# Patient Record
Sex: FEMALE | Race: WHITE
[De-identification: names, ages, dates, MRNs, and addresses within clinical notes are randomized per-mention and may not be internally consistent; named-entity substitution may affect disease eponyms.]

---

## 2017-11-18 ENCOUNTER — HOSPITAL ENCOUNTER (EMERGENCY)
Dept: HOSPITAL 80 - FED | Age: 82
Discharge: HOME | End: 2017-11-18
Payer: COMMERCIAL

## 2017-11-18 VITALS — RESPIRATION RATE: 16 BRPM

## 2017-11-18 VITALS
SYSTOLIC BLOOD PRESSURE: 155 MMHG | HEART RATE: 70 BPM | DIASTOLIC BLOOD PRESSURE: 96 MMHG | TEMPERATURE: 98.4 F | OXYGEN SATURATION: 96 %

## 2017-11-18 DIAGNOSIS — S00.83XA: ICD-10-CM
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
## 2017-11-18 DIAGNOSIS — S46.911A: Primary | ICD-10-CM

## 2017-11-18 NOTE — EDPHY
H & P


Stated Complaint: fall


Time Seen by Provider: 11/18/17 08:02


HPI/ROS: 





CHIEF COMPLAINT:  Mechanical fall, right arm pain, minor facial injury





HISTORY OF PRESENT ILLNESS:  The patient has a history of Parkinson's disease.  

She was getting out of her bed today and her walker was in on locked position.  

It slipped as she was standing up causing her to fall forward.  She struck her 

left cheek.  She also twisted her right arm.  The patient has been ambulatory 

since that time.  The patient denies any anticoagulant use.  She has no 

complaints of headache or neck pain.  The patient does complain of right 

shoulder pain.  She denies any acute abdominal pain, severe headache, numbness, 

acute weakness or additional complaints.  The pain in her right arm as mild and 

worsened with movement.





REVIEW OF SYSTEMS:


A comprehensive 10 point review of systems is otherwise negative aside from 

elements mentioned in the history of present illness.


Source: Patient


Exam Limitations: No limitations





- Personal History


Current Tetanus Diphtheria and Acellular Pertussis (TDAP): Unsure





- Medical/Surgical History


Hx Asthma: No


Hx Chronic Respiratory Disease: No


Hx Diabetes: No


Hx Cardiac Disease: No


Hx Renal Disease: Yes


Hx Cirrhosis: No


Hx Alcoholism: No


Hx HIV/AIDS: No


Hx Splenectomy or Spleen Trauma: No


Other PMH: medical  Nephritis at young age, Rheumatic fever, Parkinsons.  

surgery  tonsillectomy, appendectomy, tubes tied at 42, cosmetic surgery.





- Social History


Smoking Status: Never smoked





- Physical Exam


Exam: 





General Appearance:  Elderly female, no acute distress


Head:  Mild ecchymotic changes noted to left cheek


Eyes:  Pupils equal, round, reactive


ENT, Mouth:  No hemotympanum, no oral trauma


Neck:  Nontender, trachea midline


Respiratory:  No chest wall tender, subcutaneous air, lungs clear bilaterally


Cardiovascular:  Regular rate and rhythm


Abdomen:  Abdomen is soft and nontender, pelvis stable


Skin:  No lacerations, No abrasion


Back:  No midline T/L/S pain


Extremities:  Tenderness to palpation right shoulder


Neurological:  A&Ox3, normal motor function, normal sensory exam, mild cogwheel 

rigidity consistent with Parkinson's disease


Constitutional: 


 Initial Vital Signs











Temperature (C)  36.4 C   11/18/17 08:00


 


Heart Rate  73   11/18/17 08:00


 


Respiratory Rate  16   11/18/17 08:00


 


Blood Pressure  171/97 H  11/18/17 08:00


 


O2 Sat (%)  94   11/18/17 08:00








 











O2 Delivery Mode               Room Air














Allergies/Adverse Reactions: 


 





No Known Allergies Allergy (Unverified 06/23/14 13:56)


 








Home Medications: 














 Medication  Instructions  Recorded


 


Alendronate Sodium [Fosamax 10 MG]  06/23/14


 


Azilect  06/23/14


 


Carbidopa-Levo  mg Odt  06/23/14


 


Fesoterodine Fumarate [Toviaz] 4 mg PO 06/23/14


 


Mirabegron [Myrbetriq]  03/03/15


 


Mirtazapine [Remeron]  03/03/15


 


Rivastigmine [Exelon 4.6mg/24  03/03/15





hours]  


 


PRILOSEC  07/18/16














Medical Decision Making





- Diagnostics


Imaging Results: 


 Imaging Impressions





Chest X-Ray  11/18/17 08:04


Impression: Nothing acute identified.


 








Humerus X-Ray  11/18/17 08:04


Impression:


1. Normal humerus.


2. Possible tiny glenoid chip of undetermined chronicity or etiology.











ED Course/Re-evaluation: 





The patient presents to the ED for evaluation of facial trauma and a right arm 

and chest wall injury following a mechanical fall.  Upon arrival the patient is 

noted to be neurologically intact.  She has a GCS of 15.  She has no midline 

cervical spine tenderness.  The patient's abdominal examination is reassuring.  

She did have tenderness in her right humerus and mild right anterior chest wall 

tenderness.  She had x-rays of her humerus and chest which demonstrated no 

evidence of significant traumatic injury.





The patient does have mild facial ecchymosis.  I do not see indication for head 

CT scanning given her lack of hematoma, lack of headache, normal neurologic 

exam and the fact that she is not anticoagulated.





The patient will be discharged from the emergency department with customary 

aftercare instructions and return precautions.





Re-examination at 9:00 a.m.:  GCS 15, no acute distress, no additional 

traumatic injury noted


Differential Diagnosis: 





Differential diagnosis considered includes fracture, sprain, dislocation





Departure





- Departure


Disposition: Home, Routine, Self-Care


Clinical Impression: 


 Facial contusion, Strain of upper arm, right





Condition: Good


Instructions:  Contusion in Adults (ED), Musculoskeletal Pain (ED)


Additional Instructions: 


1. Take Ibuprofen or Motrin 600 mg by mouth three times a day.


2. Return to the ED for any worsening symptoms, difficulty breathing or other 

concerns.


Referrals: 


Frankel,Zara, MD [Primary Care Provider] - As per Instructions

## 2018-01-06 ENCOUNTER — HOSPITAL ENCOUNTER (INPATIENT)
Dept: HOSPITAL 80 - FED | Age: 83
LOS: 2 days | Discharge: HOME HEALTH | DRG: 149 | End: 2018-01-08
Attending: INTERNAL MEDICINE | Admitting: INTERNAL MEDICINE
Payer: COMMERCIAL

## 2018-01-06 DIAGNOSIS — R42: Primary | ICD-10-CM

## 2018-01-06 DIAGNOSIS — R05: ICD-10-CM

## 2018-01-06 DIAGNOSIS — B34.9: ICD-10-CM

## 2018-01-06 DIAGNOSIS — G20: ICD-10-CM

## 2018-01-06 DIAGNOSIS — N17.9: ICD-10-CM

## 2018-01-06 DIAGNOSIS — E86.0: ICD-10-CM

## 2018-01-06 LAB — PLATELET # BLD: 238 10^3/UL (ref 150–400)

## 2018-01-06 PROCEDURE — G0378 HOSPITAL OBSERVATION PER HR: HCPCS

## 2018-01-06 NOTE — PDGENHP
History and Physical





- Chief Complaint


Dizziness





- History of Present Illness


83 yo F w/ PD presents with dizziness. Patient has been feeling unwell for 

about 1 week. She first noticed a cough on Monday (6 days PTA), which she 

states several residents in her facility have. Over the next few days she noted 

low urine output and a UA was checked. She was started on Bactrim 3 days ago 

for a presumed infectious UA, although I do not have this data to review. After 

the Bactrim was started she began to notice dizziness, which was the main 

reason for presentation. She has a difficult time describing the nature of her 

dizziness but thinks it is most present with ambulation and less so at rest. 

She does not think the room is spinning around her.





In the ED an MRI brain was obtain that revealed no acute abnormalities. 

Laboratory work-up revealed only a mild JU. She is being admitted for 

persistent symptoms and observation.





History Information





- Allergies/Home Medication List


Allergies/Adverse Reactions: 








No Known Allergies Allergy (Verified 01/06/18 18:12)


 





Home Medications: 








Albuterol Sulfate [Proair Hfa] 1 puffs IH QID PRN 01/06/18 [Last Taken Unknown]


Carbidopa/Levodopa [Carbidopa-Levo  mg Odt] 2 each PO TID 01/06/18 [Last 

Taken 01/06/18 12:00]


Escitalopram Oxalate [Lexapro] 10 mg PO HS 01/06/18 [Last Taken 01/05/18]


Fluticasone Nasal [Flonase Nasal Spray (RX)] 1 sprays NASAL DAILY 01/06/18 [

Last Taken Unknown]


Herbals/Supplements -Info Only 1 ea PO DAILY 01/06/18 [Last Taken Unknown]


Mirabegron [Myrbetriq] 25 mg PO DAILY 01/06/18 [Last Taken 01/06/18]


Rivastigmine [Exelon 4.6mg/24 hours] 1 each TD DAILY 01/06/18 [Last Taken 01/06/ 18]


Sulfamethox/Tmp 800/160 mg [Bactrim Ds] 1 tab PO BID 01/06/18 [Last Taken 01/06/ 18 08:00]





I have personally reviewed and updated: family history, medical history





- Past Medical History


Additional medical history: Parkinson's Disease





- Family History


Positive for: cancer





- Social History


Smoking Status: Never smoked





Review of Systems


Review of Systems: 





ROS: 10pt was reviewed & negative except for what was stated in HPI & below





Physical Exam


Physical Exam: 

















Temp Pulse Resp BP Pulse Ox


 


 36.7 C   83   18   149/82 H  96 


 


 01/06/18 18:12  01/06/18 18:12  01/06/18 18:12  01/06/18 18:12  01/06/18 18:12











Constitutional: no apparent distress, not in pain


Eyes: PERRL, EOMI


Ears, Nose, Mouth, Throat: moist mucous membranes, ears appear normal


Cardiovascular: regular rate and rhythym, no murmur, rub, or gallop


Respiratory: no respiratory distress, clear to auscultation


Gastrointestinal: normoactive bowel sounds, soft, non-tender abdomen


Skin: warm, normal color


Musculoskeletal: full muscle strength, no muscle tenderness, other (Mild cog-

wheeling in b/l UE's)


Psychiatric: interacting appropriately, not anxious





Lab Data & Imaging Review





 01/06/18 18:30





 01/06/18 18:30














WBC  6.61 10^3/uL (3.80-9.50)   01/06/18  18:30    


 


RBC  3.98 10^6/uL (4.18-5.33)  L  01/06/18  18:30    


 


Hgb  12.6 g/dL (12.6-16.3)   01/06/18  18:30    


 


Hct  36.9 % (38.0-47.0)  L  01/06/18  18:30    


 


MCV  92.7 fL (81.5-99.8)   01/06/18  18:30    


 


MCH  31.7 pg (27.9-34.1)   01/06/18  18:30    


 


MCHC  34.1 g/dL (32.4-36.7)   01/06/18  18:30    


 


RDW  14.0 % (11.5-15.2)   01/06/18  18:30    


 


Plt Count  238 10^3/uL (150-400)   01/06/18  18:30    


 


MPV  8.9 fL (8.7-11.7)   01/06/18  18:30    


 


Neut % (Auto)  68.6 % (39.3-74.2)   01/06/18  18:30    


 


Lymph % (Auto)  18.6 % (15.0-45.0)   01/06/18  18:30    


 


Mono % (Auto)  9.7 % (4.5-13.0)   01/06/18  18:30    


 


Eos % (Auto)  2.0 % (0.6-7.6)   01/06/18  18:30    


 


Baso % (Auto)  0.9 % (0.3-1.7)   01/06/18  18:30    


 


Nucleat RBC Rel Count  0.0 % (0.0-0.2)   01/06/18  18:30    


 


Absolute Neuts (auto)  4.54 10^3/uL (1.70-6.50)   01/06/18  18:30    


 


Absolute Lymphs (auto)  1.23 10^3/uL (1.00-3.00)   01/06/18 18:30    


 


Absolute Monos (auto)  0.64 10^3/uL (0.30-0.80)   01/06/18  18:30    


 


Absolute Eos (auto)  0.13 10^3/uL (0.03-0.40)   01/06/18  18:30    


 


Absolute Basos (auto)  0.06 10^3/uL (0.02-0.10)   01/06/18  18:30    


 


Absolute Nucleated RBC  0.00 10^3/uL (0-0.01)   01/06/18  18:30    


 


Immature Gran %  0.2 % (0.0-1.1)   01/06/18  18:30    


 


Immature Gran #  0.01 10^3/uL (0.00-0.10)   01/06/18  18:30    


 


Sodium  139 mEq/L (134-144)   01/06/18  18:30    


 


Potassium  4.5 mEq/L (3.5-5.2)   01/06/18  18:30    


 


Chloride  101 mEq/L ()   01/06/18  18:30    


 


Carbon Dioxide  24 mEq/l (22-31)   01/06/18  18:30    


 


Anion Gap  14 mEq/L (8-16)   01/06/18  18:30    


 


BUN  23 mg/dL (7-23)   01/06/18  18:30    


 


Creatinine  1.3 mg/dL (0.6-1.0)  H  01/06/18  18:30    


 


Estimated GFR  39   01/06/18  18:30    


 


Glucose  93 mg/dL ()   01/06/18  18:30    


 


Calcium  10.0 mg/dL (8.5-10.4)   01/06/18  18:30    


 


Total Bilirubin  0.9 mg/dL (0.1-1.4)   01/06/18  18:30    


 


AST  37 IU/L (14-46)   01/06/18  18:30    


 


ALT  20 IU/L (9-52)   01/06/18  18:30    


 


Alkaline Phosphatase  102 IU/L ()   01/06/18  18:30    


 


Total Protein  8.5 g/dL (6.3-8.2)  H  01/06/18  18:30    


 


Albumin  4.4 g/dL (3.5-5.0)   01/06/18  18:30    


 


Urine Color  YELLOW   01/06/18  21:10    


 


Urine Appearance  CLEAR   01/06/18  21:10    


 


Urine pH  6.0  (5.0-7.5)   01/06/18  21:10    


 


Ur Specific Gravity  1.005  (1.002-1.030)   01/06/18  21:10    


 


Urine Protein  NEGATIVE  (NEGATIVE)   01/06/18  21:10    


 


Urine Ketones  TRACE  (NEGATIVE)  H  01/06/18  21:10    


 


Urine Blood  NEGATIVE  (NEGATIVE)   01/06/18  21:10    


 


Urine Nitrate  NEGATIVE  (NEGATIVE)   01/06/18  21:10    


 


Urine Bilirubin  NEGATIVE  (NEGATIVE)   01/06/18  21:10    


 


Urine Urobilinogen  NEGATIVE EU (0.2-1.0)   01/06/18  21:10    


 


Ur Leukocyte Esterase  NEGATIVE  (NEGATIVE)   01/06/18  21:10    


 


Urine RBC  1-3 /hpf (0-3)   01/06/18  21:10    


 


Urine WBC  1-3 /hpf (0-3)   01/06/18  21:10    


 


Ur Epithelial Cells  NONE SEEN /lpf (NONE-1+)   01/06/18  21:10    


 


Urine Mucus  TRACE /lpf (NONE-1+)   01/06/18  21:10    


 


Urine Glucose  NEGATIVE  (NEGATIVE)   01/06/18  21:10    








Imaging Review: 





 Imaging Impressions





Brain MRI  01/06/18 19:04


Impression: Extensive periventricular and deep hemispheric white matter change, 

which is nonspecific and can be seen with small vessel ischemic disease. This 

has progressed from September 2006. No evidence for acute infarct.


 


Results called and discussed with Vladimir Yeung MD on January 6, 2018 at 

2113 hours.














Assessment & Plan


Assessment: 








83 yo F w/ PD presents with 3 days of dizziness in the setting of URI, possible 

UTI, dehydration, and Bactrim administration.





Plan: 


1. Dizziness - I suspect this is multifactorial in etiology; likely from 

dehydration, viral illness, possible UTI, and poor functional baseline in the 

setting of PD and deconditioning. Patient's symptoms are not classic for 

vertigo and MRI obtained in the ED shows no acute abnormalities.


- S/p 1.5 L IVF in the ED, I encouraged further PO intake


- Admit for observation and PT/OT evaluations


- I do not feel a Neurology evaluation is necessary at this time noting well 

controlled PD and stable brain MRI


2. JU - Serum Cr 1.3 on admission, increased from b/l of about 1. This is 

likely due to dehydration with possible contribution from Bactrim 

administration.


- S/p 1.5 L IVF, monitor BMP in AM


- Avoid nephrotoxic agents


3. Recent UTI - Completed 3 days of Bactrim therapy for presumed cystitis; will 

not continue further therapy noting normal UA on admission.


4. Parkinson's Disease - Patient reports good disease control and states 

Neurologist and outpatient PT have told her she is doing better than she has 

done in several months. I will continue Carbidopa/Levodopa and Rivastigmine at 

home doses.





Diet - Regular


Code - Full


Ppx - LMWH, low dose


Dispo - Admit to observation status

## 2018-01-06 NOTE — EDPHY
H & P


Stated Complaint: Started on Bactrim 3 days ago for UTI;now w/nausea,not 

feeling well,cough


Time Seen by Provider: 01/06/18 18:21





- Personal History


Current Tetanus Diphtheria and Acellular Pertussis (TDAP): Yes





- Medical/Surgical History


Hx Asthma: No


Hx Chronic Respiratory Disease: No


Hx Diabetes: No


Hx Cardiac Disease: No


Hx Renal Disease: Yes


Hx Cirrhosis: No


Hx Alcoholism: No


Hx HIV/AIDS: No


Hx Splenectomy or Spleen Trauma: No


Other PMH: Nephritis at young age, Rheumatic fever, Parkinsons.  tonsillectomy, 

appendectomy, tubes tied at 42, cosmetic surgery.





- Social History


Smoking Status: Never smoked


Constitutional: 


 Initial Vital Signs











Temperature (C)  36.7 C   01/06/18 18:12


 


Heart Rate  83   01/06/18 18:12


 


Respiratory Rate  18   01/06/18 18:12


 


Blood Pressure  149/82 H  01/06/18 18:12


 


O2 Sat (%)  96   01/06/18 18:12








 











O2 Delivery Mode               Room Air














Allergies/Adverse Reactions: 


 





No Known Allergies Allergy (Verified 01/06/18 18:12)


 








Home Medications: 














 Medication  Instructions  Recorded


 


Carbidopa/Levodopa [Carbidopa-Levo 1 each PO 01/06/18





 mg Odt]  


 


Escitalopram Oxalate [Lexapro]  01/06/18


 


Mirabegron [Myrbetriq] 25 mg PO 01/06/18


 


Rivastigmine [Exelon 4.6mg/24 1 each TD 01/06/18





hours]  


 


Sulfamethox/Tmp 800/160 mg 1 tab PO 01/06/18





[Bactrim Ds]  














Medical Decision Making





- Diagnostics


Imaging Results: 


 Imaging Impressions





Brain MRI  01/06/18 19:04


Impression: Extensive periventricular and deep hemispheric white matter change, 

which is nonspecific and can be seen with small vessel ischemic disease. This 

has progressed from September 2006. No evidence for acute infarct.


 


Results called and discussed with Vladimir Yeung MD on January 6, 2018 at 

2113 hours.











Imaging: Discussed imaging studies w/ On call Radiologist, I viewed and 

interpreted images myself


ED Course/Re-evaluation: 





CHIEF COMPLAINT:  Nausea, lightheadedness, dizziness





HISTORY OF PRESENT ILLNESS: The patient is an 81 y/o female with a history of 

Parkinson's who is currently being treated for a UTI and complains of nausea, 

lightheadedness, and dizziness for 3 days. She was diagnosed with a UTI 3 days 

ago and started Bactrim at that time. She states her dizziness feels like she 

is rocking on a boat and seasick; she cannot identify the exact moment the 

dizziness began. She's had difficulty eating and drinking due to her nausea and 

feels dehydrated. She does not have associated fever, chest pain, dyspnea, 

abdominal pain. She denies changes in Parkinson's symptoms since symptom onset. 

Her caregiver also states the patient's BP has been elevated around 145 

systolic rather than 90s systolic for the last few days.





REVIEW OF SYSTEMS:  





A 10 point review of systems was performed and is negative with the exception 

of the elements mentioned in the history of present illness. Dry cough for last 

week. 





PHYSICAL EXAM:  





HR, BP, O2 Sat, RR.  Temp noted


General Appearance:  Alert, well hydrated, appropriate, and non-toxic appearing.


Head:  Atraumatic without scalp tenderness or obvious injury


Eyes:  Pupils equal, round, reactive to light and accommodation, EOMI, no trauma

, no injection.


Ears:  Clear bilaterally, no perforation, normal landmarks


Nose:  Atraumatic, no rhinorrhea, clear.


Throat:  There is no erythema or exudates, no lesions, normal tonsils, mucus 

membranes moist.


Neck:  Supple, nontender, no lymphadenopathy.


Respiratory:  No retractions, no distress, no wheezes, and no accessory muscle 

use.  Lungs are clear to auscultation bilaterally.


Cardiovascular:  Regular rate and rhythm, no murmurs, rubs, or gallops. Good 

capillary refill all extremities.


Gastrointestinal:  Abdomen is soft, nontender, non-distended, no masses, no 

rebound, no guarding, no peritoneal signs.


Musculoskeletal:  Normal active ROM of all extremities, atraumatic.


Neurological:  Alert, appropriate, and interactive.  Nonfocal neuro exam. 


Skin:  No rashes, good turgor, no nodules on palpation.





Past medical history: Nephritis, rheumatic fever, urinary incontinence, 

Parkinson's - Lexapro, levodopa, carbidopa 


Past surgical history: Tonsillectomy, appendectomy, tubal ligation, cosmetic 

surgery


Family history: Noncontributory


Social history: Nonsmoker. Lives at [place] with caregiver for 3 hours daily. 

Caregiver, daughter, and  at bedside. Daughter lives in [place]. Rides 

bike 20-30min daily. PCP: Dr. Frankel.





Prior medical records reviewed including ED visit 11/18/17 for fall. 





DIAGNOSTICS/PROCEDURES/CRITICAL CARE TIME:  





Brain MRI: nothing acute





DIFFERENTIAL DIAGNOSIS:   The differential diagnosis for the patient's 

dizziness included but was not limited to peripheral and central causes of 

vertigo, orthostatic causes including dehydration, cardiogenic and neurogenic 

causes, and blood loss.





MEDICAL DECISION MAKING:  





This is an 81 y/o female with Parkinson's disease who presents for evaluation 

of nausea, lightheadedness, and dizziness in the setting of recent UTI 

diagnosis being treated empirically with Bactrim. It's difficult to determine 

if patient's nausea or dizziness appeared first. Presentation could represent 

positional vertigo, dehydration, and/or UTI not sensitive to Bactrim. Brain MRI 

ordered to rule out cerebellar infarct. Plan for IV, labs, and symptom 

management. 500mL IV NS and 4mg IV Zofran administered. 





Empty bladder during straight cath for sample. Additional 1L IV NS 

administered. 





Brain MRI negative for acute process. UA negative. No obvious source of 

infection. Patient is unable to care for herself with this dizziness and nausea 

and has been unable to maintain hydration at home. I've recommended admission, 

which she agrees to. 





Spoke with hospitalist service. Dr. Iyer accepts admission. 





- Data Points


Laboratory Results: 


 Laboratory Results





 01/06/18 18:30 





 01/06/18 18:30 





 











  01/06/18 01/06/18 01/06/18





  21:10 18:30 18:30


 


WBC      6.61 10^3/uL 10^3/uL





     (3.80-9.50) 


 


RBC      3.98 10^6/uL L 10^6/uL





     (4.18-5.33) 


 


Hgb      12.6 g/dL g/dL





     (12.6-16.3) 


 


Hct      36.9 % L %





     (38.0-47.0) 


 


MCV      92.7 fL fL





     (81.5-99.8) 


 


MCH      31.7 pg pg





     (27.9-34.1) 


 


MCHC      34.1 g/dL g/dL





     (32.4-36.7) 


 


RDW      14.0 % %





     (11.5-15.2) 


 


Plt Count      238 10^3/uL 10^3/uL





     (150-400) 


 


MPV      8.9 fL fL





     (8.7-11.7) 


 


Neut % (Auto)      68.6 % %





     (39.3-74.2) 


 


Lymph % (Auto)      18.6 % %





     (15.0-45.0) 


 


Mono % (Auto)      9.7 % %





     (4.5-13.0) 


 


Eos % (Auto)      2.0 % %





     (0.6-7.6) 


 


Baso % (Auto)      0.9 % %





     (0.3-1.7) 


 


Nucleat RBC Rel Count      0.0 % %





     (0.0-0.2) 


 


Absolute Neuts (auto)      4.54 10^3/uL 10^3/uL





     (1.70-6.50) 


 


Absolute Lymphs (auto)      1.23 10^3/uL 10^3/uL





     (1.00-3.00) 


 


Absolute Monos (auto)      0.64 10^3/uL 10^3/uL





     (0.30-0.80) 


 


Absolute Eos (auto)      0.13 10^3/uL 10^3/uL





     (0.03-0.40) 


 


Absolute Basos (auto)      0.06 10^3/uL 10^3/uL





     (0.02-0.10) 


 


Absolute Nucleated RBC      0.00 10^3/uL 10^3/uL





     (0-0.01) 


 


Immature Gran %      0.2 % %





     (0.0-1.1) 


 


Immature Gran #      0.01 10^3/uL 10^3/uL





     (0.00-0.10) 


 


Sodium    139 mEq/L mEq/L  





    (134-144)  


 


Potassium    4.5 mEq/L mEq/L  





    (3.5-5.2)  


 


Chloride    101 mEq/L mEq/L  





    ()  


 


Carbon Dioxide    24 mEq/l mEq/l  





    (22-31)  


 


Anion Gap    14 mEq/L mEq/L  





    (8-16)  


 


BUN    23 mg/dL mg/dL  





    (7-23)  


 


Creatinine    1.3 mg/dL H mg/dL  





    (0.6-1.0)  


 


Estimated GFR    39   





    


 


Glucose    93 mg/dL mg/dL  





    ()  


 


Calcium    10.0 mg/dL mg/dL  





    (8.5-10.4)  


 


Total Bilirubin    0.9 mg/dL mg/dL  





    (0.1-1.4)  


 


AST    37 IU/L IU/L  





    (14-46)  


 


ALT    20 IU/L IU/L  





    (9-52)  


 


Alkaline Phosphatase    102 IU/L IU/L  





    ()  


 


Total Protein    8.5 g/dL H g/dL  





    (6.3-8.2)  


 


Albumin    4.4 g/dL g/dL  





    (3.5-5.0)  


 


Urine Color  YELLOW     





    


 


Urine Appearance  CLEAR     





    


 


Urine pH  6.0     





   (5.0-7.5)   


 


Ur Specific Gravity  1.005     





   (1.002-1.030)   


 


Urine Protein  NEGATIVE     





   (NEGATIVE)   


 


Urine Ketones  TRACE  H     





   (NEGATIVE)   


 


Urine Blood  NEGATIVE     





   (NEGATIVE)   


 


Urine Nitrate  NEGATIVE     





   (NEGATIVE)   


 


Urine Bilirubin  NEGATIVE     





   (NEGATIVE)   


 


Urine Urobilinogen  NEGATIVE EU EU    





   (0.2-1.0)   


 


Ur Leukocyte Esterase  NEGATIVE     





   (NEGATIVE)   


 


Urine RBC  1-3 /hpf /hpf    





   (0-3)   


 


Urine WBC  1-3 /hpf /hpf    





   (0-3)   


 


Ur Epithelial Cells  NONE SEEN /lpf /lpf    





   (NONE-1+)   


 


Urine Mucus  TRACE /lpf /lpf    





   (NONE-1+)   


 


Urine Glucose  NEGATIVE     





   (NEGATIVE)   











Medications Given: 


 








Discontinued Medications





Sodium Chloride (Ns)  500 mls @ 0 mls/hr IV ONCE ONE


   PRN Reason: Wide Open


   Stop: 01/06/18 18:40


   Last Admin: 01/06/18 18:40 Dose:  500 mls


Sodium Chloride (Ns)  1,000 mls @ 0 mls/hr IV ONCE ONE


   PRN Reason: Wide Open


   Stop: 01/06/18 19:55


   Last Admin: 01/06/18 20:11 Dose:  1,000 mls


Ondansetron HCl (Zofran)  4 mg IVP EDNOW ONE


   Stop: 01/06/18 18:36


   Last Admin: 01/06/18 18:37 Dose:  4 mg








Departure





- Departure


Disposition: [facility]


Clinical Impression: 


 Dizziness, Dehydration, Nausea, Parkinsons disease





Condition: Fair


Referrals: 


Frankel,Zara, MD [Primary Care Provider] - As per Instructions


Report Scribed for: Vladimir Yeung


Report Scribed by: Мария Rendon


Date of Report: 01/06/18


Time of Report: 18:46

## 2018-01-07 LAB — PLATELET # BLD: 211 10^3/UL (ref 150–400)

## 2018-01-07 RX ADMIN — CARBIDOPA AND LEVODOPA SCH TAB: 25; 100 TABLET ORAL at 00:19

## 2018-01-07 RX ADMIN — CARBIDOPA AND LEVODOPA SCH TAB: 25; 100 TABLET ORAL at 20:34

## 2018-01-07 RX ADMIN — CARBIDOPA AND LEVODOPA SCH TAB: 25; 100 TABLET ORAL at 15:25

## 2018-01-07 RX ADMIN — CARBIDOPA AND LEVODOPA SCH TAB: 25; 100 TABLET ORAL at 09:05

## 2018-01-07 RX ADMIN — ENOXAPARIN SODIUM SCH MG: 100 INJECTION SUBCUTANEOUS at 09:05

## 2018-01-07 NOTE — PDMN
Medical Necessity


Medical necessity: C/M review:  est. > 2 MN LOS for eval and TX of acute and 

persistent dizziness, dehydration, acute kidney injury, cough with borderline 

hypoxemia, weakness,  requiring IV fluids, ongoing pulse oximetry, supplemental 

O2, acute inpt PT, comorbid upper respiratory infection, recent urinary tract 

infection treated with three dats of oral Bactrim, Parkinson's disease, patient 

has not walked independently since admission per 1/7/2018 Hospitalist progress 

note.

## 2018-01-07 NOTE — HOSPPROG
Hospitalist Progress Note


Assessment/Plan: 





83 yo F w/ PD presents with 3 days of dizziness in the setting of URI, possible 

UTI, dehydration, and Bactrim administration.  Patient is new to me today





- Dizziness - multifactorial in nature secondary to dehydration viral illness 

possible recent UTI.  She appears to be close to her usual functional status 

and the MRI does not show any acute findings as explanation for the dizziness.  

Plan would be for fluid hydration to continue see if this resolves





-acute kidney injury with a serum creatinine 1.3 on admission now declining.  

This was due to dehydration and improving





-persistent cough with borderline hypoxemia.  Chest x-ray shows chronic 

interstitial lung disease and perhaps the patient has a chronic cough although 

she seems trouble by it and says that this was the reason for her problem.  Her 

respiratory panel was ordered at this time.  Flu seems a possible differential.





-recent UTI:  She has completed 3 days of Bactrim and this has been stopped.





-Parkinson's disease:  The patient reports she has had good disease control and 

has been told by a neurologist that she is doing better.  We are continuing the 

carbidopa levodopa and rivastigmine at home doses.





-disposition:  Patient still has dizziness and a persistent cough and 

respiratory panel is pending.  1 more evening in the hospital seems prudent and 

it seems improved to discharge her late in the evening to her assisted living.  

She has yet to walk independently in the encarnacion as she would at her assisted 

living.





-code:  Full





-DVT prophylaxis with LMWH








Subjective: Reports she has a persistent cough that is nonproductive and says 

that has been ongoing for 3-5 days she believes this is the reason she came to 

the hospital.  She admits that her dizziness seems to be better although she 

has not walked in the encarnacion and she reports feeling weak


Objective: 


 Vital Signs











Temp Pulse Resp BP Pulse Ox


 


 37.1 C   98   16   123/81 H  96 


 


 01/07/18 16:00  01/07/18 16:00  01/07/18 16:00  01/07/18 16:00  01/07/18 16:00








 Laboratory Results





 01/07/18 04:17 





 01/07/18 04:17 





 











 01/06/18 01/07/18 01/08/18





 05:59 05:59 05:59


 


Intake Total  2325 600


 


Output Total  550 550


 


Balance  1775 50














- Time Spent With Patient


Time Spent with Patient: greater than 35 minutes


Time Spent with Patient: Greater than 35 minutes spent on this patients care, 

greater than 50% of time spent counseling, educating, and coordinating care 

regarding the above mentioned plan.





- Pending Discharge


Pending Discharge Within 24 Hours: Yes


Pending Discharge Date: 01/08/18


Pending Discharge Time: 11:00





- Physical Exam


Constitutional: no apparent distress


Eyes: PERRL


Ears, Nose, Mouth, Throat: moist mucous membranes, hard of hearing


Cardiovascular: regular rate and rhythym, no murmur, rub, or gallop


Respiratory: reduced air movement, inspiratory crackles, bronchial breath sounds

, rhonchi


Gastrointestinal: normoactive bowel sounds, soft, non-tender abdomen


Genitourinary: no bladder fullness


Skin: warm


Musculoskeletal: generalized weakness


Neurologic: AAOx3, CN II-XII Intact





ICD10 Worksheet


Patient Problems: 


 Problems











Problem Status Onset


 


Dizziness Acute  


 


Dehydration Acute  


 


Nausea Acute  


 


Parkinsons disease Acute

## 2018-01-08 VITALS — RESPIRATION RATE: 16 BRPM

## 2018-01-08 VITALS — DIASTOLIC BLOOD PRESSURE: 60 MMHG | SYSTOLIC BLOOD PRESSURE: 109 MMHG | TEMPERATURE: 97.4 F | HEART RATE: 89 BPM

## 2018-01-08 VITALS — OXYGEN SATURATION: 96 %

## 2018-01-08 RX ADMIN — CARBIDOPA AND LEVODOPA SCH TAB: 25; 100 TABLET ORAL at 11:08

## 2018-01-08 RX ADMIN — ENOXAPARIN SODIUM SCH MG: 100 INJECTION SUBCUTANEOUS at 11:09

## 2018-01-08 NOTE — ASMTCMCOM
CM Note

 

CM Note                       

Notes:

Hospitalist medically cleared patient for discharge. PT and OT home care ordered through [facility]. Spoke to there intake and sent via [employer]. CM available should other needs arise.

 

Date Signed:  01/08/2018 03:12 PM

Electronically Signed By:Juani Pool RN

## 2018-01-08 NOTE — GDS
[f rep st]



                                                             DISCHARGE SUMMARY





DISCHARGE DIAGNOSES:  

1.  Dizziness. 

2.  Acute kidney injury. 

3.  Cough. 

4.  Parkinson disease. 

5.  Dehydration.



STUDIES AND PROCEDURES DONE:  MRI of the brain.



PHYSICAL EXAM:  GENERAL:  The patient is alert.  VITAL SIGNS:  Afebrile at 36.3, pulse is 89, respira
tory rate 16, blood pressure is 109/60.  She is saturating 96% on 3 L.  I have seen and evaluated the
 patient on the day of discharge.



HOSPITAL COURSE:  The patient is an 82-year-old female who presented to the emergency room with compl
aints of dizziness.  She was evaluated and diagnosed with:

1.  Dizziness.  This is multifactorial.  The patient was dehydrated as well as representing a viral i
llness at the time of admission.  Her condition has significantly improved.  She also was recently di
agnosed with urinary tract infection and treated with Bactrim, which could be exacerbating her dizzin
ess.  She has been evaluated by Physical Therapy and Occupational Therapy, and will receive Home Heal
 care at the time of disposition.

2.  Acute kidney injury.  This is improving with hydration, this is secondary to the patient's acute 
viral illness and dehydration.

3.  Persistent cough.  This is stable.  Chest x-ray demonstrates interstitial lung disease.  She shou
ld follow with her primary provider regarding this condition.

4.  History of Parkinson disease.  Her medications have been continued during this hospital course.



DISPOSITION:  The patient will be discharged to return to her normal living environment at [facility].  
Home health care has been provided for the patient at the time of disposition.  Followup will be with
 her primary care physician, Dr. Zara Frankel.  There are no other pending studies.



DISCHARGE MEDICATIONS:  Please refer to EMR form.  I have not provided the patient any prescriptions 
at the time of disposition.  



I spent greater than 35 minutes in the care, coordination, and management of this patient's dispositi
on.





Job #:  [id number]

## 2018-01-08 NOTE — PDIAF
- Diagnosis


Diagnosis: dizziness


Code Status: Full Code





- Medication Management


Discharge Medications: 


 Medications to Continue on Transfer





Albuterol Sulfate [Proair Hfa] 1 puffs IH QID PRN 01/06/18 [Last Taken Unknown]


Carbidopa/Levodopa [Carbidopa-Levo  mg Odt] 2 each PO TID 01/06/18 [Last 

Taken 01/06/18 12:00]


Escitalopram Oxalate [Lexapro] 10 mg PO HS 01/06/18 [Last Taken 01/05/18]


Fluticasone Nasal [Flonase Nasal Spray] 1 sprays NASAL DAILY 01/06/18 [Last 

Taken Unknown]


Herbals/Supplements -Info Only 1 ea PO DAILY 01/06/18 [Last Taken Unknown]


Mirabegron [Myrbetriq] 25 mg PO DAILY 01/06/18 [Last Taken 01/06/18]


Rivastigmine [Exelon 4.6mg/24 hours] 1 each TD DAILY 01/06/18 [Last Taken 01/06/ 18]








Discharge Medications: Refer to the Discharge Home Medication list for PRN 

reason.


PICC Care - Routine: N/A





- Orders


Services needed: Physical Therapy, Occupational Therapy


Diet Recommendation: no restrictions on diet





- Follow Up Care


Current Providers and Referrals: 


Frankel,Zara, MD [Primary Care Provider] - As per Instructions

## 2018-01-09 NOTE — ASDISCHSUM
----------------------------------------------

Discharge Information

----------------------------------------------

Plan Status:Home with Home Health                    Medically Cleared to Leave:

Discharge Date:01/08/2018 04:37 PM                   CM D/C Disposition:Home Health Service

ADT D/C Disposition:Home Health Service              Projected Discharge Date:01/08/2018 11:00 AM

Transportation at D/C:Family                         Discharge Delay Reason:

Follow-Up Date:01/08/2018 11:00 AM                   Discharge Slot:

Final Diagnosis:

----------------------------------------------

Placement Information

----------------------------------------------

Referral Type:*Home Health Care Services             Referral ID:[id number]

Provider Name:[facility]

Address 1:[address]                 Phone [phone]

Address 2:                                           Fax [phone]

City:[place]                               Selection Factors:

State:[place]

 

----------------------------------------------

Patient Contact Information

----------------------------------------------

Contact Name:ARTEMIO                              Relationship:Other

Address:                                             Home [phone]

                                                     Work Phone:

City:                                                [facility] Phone:

State/Zip Code:                                      Email:

----------------------------------------------

Financial Information

----------------------------------------------

Financial Class:

Primary Plan Desc:MEDICARE INPATIENT                 Primary Plan Number:[id number]

Secondary Plan Desc:[facility]                             Secondary Plan Number:[id number]

 

 

----------------------------------------------

Assessment Information

----------------------------------------------

----------------------------------------------

[facility] CM Progress Note

----------------------------------------------

CM Note

 

CM Note                       

Notes:

Hospitalist medically cleared patient for discharge. PT and OT home care ordered through [facility]. Spoke to there intake and sent via [employer]. CM available should other needs arise.

 

Date Signed:  01/08/2018 03:12 PM

Electronically Signed By:Juani Pool RN

 

 

----------------------------------------------

Intervention Information

----------------------------------------------

## 2018-02-08 ENCOUNTER — HOSPITAL ENCOUNTER (OUTPATIENT)
Dept: HOSPITAL 80 - MNOR | Age: 83
End: 2018-02-08
Attending: PSYCHIATRY & NEUROLOGY
Payer: COMMERCIAL

## 2018-02-08 DIAGNOSIS — G20: ICD-10-CM

## 2018-02-08 DIAGNOSIS — K21.9: ICD-10-CM

## 2018-02-08 DIAGNOSIS — R13.10: Primary | ICD-10-CM

## 2018-02-08 PROCEDURE — 74230 X-RAY XM SWLNG FUNCJ C+: CPT

## 2018-02-08 PROCEDURE — 92611 MOTION FLUOROSCOPY/SWALLOW: CPT

## 2018-04-02 ENCOUNTER — HOSPITAL ENCOUNTER (INPATIENT)
Dept: HOSPITAL 80 - FED | Age: 83
LOS: 5 days | Discharge: NURSING, REHAB OR LONG-TERM CARE | DRG: 329 | End: 2018-04-07
Attending: SURGERY | Admitting: SURGERY
Payer: COMMERCIAL

## 2018-04-02 DIAGNOSIS — J69.0: ICD-10-CM

## 2018-04-02 DIAGNOSIS — D62: ICD-10-CM

## 2018-04-02 DIAGNOSIS — R73.9: ICD-10-CM

## 2018-04-02 DIAGNOSIS — I95.9: ICD-10-CM

## 2018-04-02 DIAGNOSIS — Z66: ICD-10-CM

## 2018-04-02 DIAGNOSIS — K55.021: Primary | ICD-10-CM

## 2018-04-02 DIAGNOSIS — N18.3: ICD-10-CM

## 2018-04-02 DIAGNOSIS — G20: ICD-10-CM

## 2018-04-02 DIAGNOSIS — L89.301: ICD-10-CM

## 2018-04-02 LAB
INR PPP: 1.19 (ref 0.83–1.16)
PLATELET # BLD: 270 10^3/UL (ref 150–400)
PROTHROMBIN TIME: 15.3 SEC (ref 12–15)

## 2018-04-02 PROCEDURE — P9041 ALBUMIN (HUMAN),5%, 50ML: HCPCS

## 2018-04-02 PROCEDURE — 0DB80ZZ EXCISION OF SMALL INTESTINE, OPEN APPROACH: ICD-10-PCS | Performed by: SURGERY

## 2018-04-02 RX ADMIN — NALOXONE HYDROCHLORIDE PRN MG: 0.4 INJECTION, SOLUTION INTRAMUSCULAR; INTRAVENOUS; SUBCUTANEOUS at 21:13

## 2018-04-02 RX ADMIN — SODIUM CHLORIDE AND POTASSIUM CHLORIDE SCH MLS: 9; 1.49 INJECTION, SOLUTION INTRAVENOUS at 23:21

## 2018-04-02 RX ADMIN — NALOXONE HYDROCHLORIDE PRN MG: 0.4 INJECTION, SOLUTION INTRAMUSCULAR; INTRAVENOUS; SUBCUTANEOUS at 21:09

## 2018-04-02 RX ADMIN — NALOXONE HYDROCHLORIDE PRN MG: 0.4 INJECTION, SOLUTION INTRAMUSCULAR; INTRAVENOUS; SUBCUTANEOUS at 20:56

## 2018-04-02 NOTE — PDANEPAE
ANE History of Present Illness


82 year old female w/PMHx of Parkinson's Dz presents with symptoms of bowel 

perforation.





ANE Past Medical History





- Cardiovascular History


Hx Hypertension: No


Hx Arrhythmias: No


Hx Chest Pain: No


Hx Coronary Artery / Peripheral Vascular Disease: No


Hx CHF / Valvular Disease: No


Hx Palpitations: No





- Pulmonary History


Hx COPD: No


Hx Asthma/Reactive Airway Disease: No


Hx Recent Upper Respiratory Infection: No


Hx Oxygen in Use at Home: No


Hx Sleep Apnea: No





- Endocrine History


Hx Diabetes: No


Hypothyroid: No


Hyperthyroid: No


Obesity: no





- Renal History


Hx Renal Disorders: No





- Liver History


Hx Hepatic Disorders: No





- Neurological & Psychiatric Hx


Neurological / Psychiatric History Comment: Parkinson's Dz.  Dementia





- Congenital Disorder History


Hx Congenital Disorders: No





ANE Review of Systems


Review of systems is: negative


Review of Systems: 








- Exercise capacity


Exercise capacity: <4 METS





ANE Patient History





- Allergies


Allergies/Adverse Reactions: 








No Known Allergies Allergy (Verified 01/06/18 18:12)


 








- Home Medications


Home Medications: 








Albuterol Sulfate [Proair Hfa] 1 puffs IH QID PRN 01/06/18 [Last Taken Unknown]


Escitalopram Oxalate [Lexapro] 10 mg PO HS 01/06/18 [Last Taken 04/01/18]


Fluticasone Nasal [Flonase Nasal Spray] 1 sprays NASAL DAILY 01/06/18 [Last 

Taken 04/02/18]


Mirabegron [Myrbetriq] 25 mg PO DAILY 01/06/18 [Last Taken 04/02/18]


Rivastigmine [Exelon 4.6mg/24 hours] 1 each TD DAILY 01/06/18 [Last Taken 04/02/ 18]


Carbidopa/Levodopa 25/100Mg [Sinemet 25/100 MG (*)] 1 tab PO TID@08,12,16 04/02/ 18 [Last Taken 04/02/18 12:00]








- Anes Hx


Anes Hx: no prior problems





- Smoking Hx


Smoking Status: Never smoked


Marijuana use: No





- Alcohol Use


Alcohol Use: None





- Family Anes Hx


Family Anes Hx: neg - N/A





ANE Labs/Vital Signs





- Labs


Result Diagrams: 


 04/02/18 10:10





 04/02/18 10:10





- Vital Signs


Vital Signs: reviewed preoperatively; see RN documention for details


Blood Pressure: 70/43


Heart Rate: 87


Respiratory Rate: 20


O2 Sat (%): 96


Height: 162.56 cm


Weight: 66.224 kg





ANE Physical Exam





- Airway


Neck exam: FROM


Mallampati Score: Class 2


Mouth exam: normal dental/mouth exam





- Pulmonary


Pulmonary: no respiratory distress





- Cardiovascular


Cardiovascular: regular rate and rhythym





- ASA Status


ASA Status: III





ANE Anesthesia Plan


Anesthesia Plan: general endotracheal anesthesia


Lines/Monitors: arterial line

## 2018-04-02 NOTE — GHP
[f 
rep st]



                                                       PREOP HISTORY AND 
PHYSICAL





DATE OF ADMISSION:  04/02/2018



SOURCE:  Taken from patient, chart review, Dr. Perez, and a family friend who 
is medical power of .



REASON FOR ADMISSION:  Small-bowel obstruction.



HISTORY OF PRESENT ILLNESS:  The patient is an 82-year-old female who resides 
at [facility] and has a history of Parkinson disease who arrived 
via EMS with worsening nausea and vomiting and abdominal pain over the last 
day.  Emesis became dark in color.  Since being in the emergency department, 
she was found to have an elevated white blood count of 14,000.  She had a CT 
scan which shows a small-bowel obstruction with possible ischemia.  Please see 
report for details.  She has seen Dr. Perez in the emergency department and 
surgery is being arranged.



PAST MEDICAL HISTORY:  Includes nephritis, rheumatic fever, Parkinson disease.



PAST SURGICAL HISTORY:  Includes tonsillectomy, appendectomy, tubal ligation, 
and cosmetic surgery.



FAMILY HISTORY:  Noncontributory.



REVIEW OF SYSTEMS:  Difficult to obtain.



MEDICATIONS:  At home include albuterol, carbidopa/levodopa, Lexapro, Flonase 
nasal spray, Myrbetriq, and rivastigmine.



ALLERGIES:  No known drug allergies.



SOCIAL HISTORY:  The patient does have a daughter, a family friend who is 
medical power of , was talking to on the phone during our exam.  She is 
a never smoker.  Again, she resides at [facility].



PHYSICAL EXAMINATION:  GENERAL:  Reveals an 82-year-old female in mild distress
, alert.  HEENT:  Mucous membranes moist.  CHEST:  Clear to auscultation 
bilaterally.  CARDIAC:  A regular rate and rhythm.  ABDOMEN:  Distended and 
diffusely tender, worse in left lower quadrant.  EXTREMITIES:  Warm and dry.



IMPRESSION:  This is an 82-year-old female with a history of abdominal surgery, 
now with a likely small-bowel obstruction with some bowel wall thickening on CT 
potentially indicating ischemic bowel and an elevated white blood count.



PLAN:  The plan will be to bring the patient to the operating room for an 
urgent laparotomy, possible lysis of adhesions, possible bowel resection.  
Risks and options have been discussed with both the patient and her medical 
power of  and include but are not limited to, bleeding, infection, 
injury to a nerve, bowel injury, a need for bowel resection, need for further 
surgeries, recurrence, and other problems, and she requests to proceed.  
Patient was also seen and examined by Dr. Perez in the emergency department.





Job #:  [id number]

MTDD

## 2018-04-02 NOTE — PDMN
Medical Necessity


Medical necessity: Pt meets IP criteria per PA; est los >2 mn for eval/tx of 

SBO w/possible bowel ischemia; admit for further monitoring, emergent surgical 

intervention, IVFs & supportive care; comorbid advance age, parkinsons, 

rheumatic fever & nephritis; per H&P & order 4/2/18

## 2018-04-02 NOTE — EDPHY
H & P


Time Seen by Provider: 04/02/18 09:56





- Medical/Surgical History


Hx Asthma: No


Hx Chronic Respiratory Disease: No


Hx Diabetes: No


Hx Cardiac Disease: No


Hx Renal Disease: Yes


Hx Cirrhosis: No


Hx Alcoholism: No


Hx HIV/AIDS: No


Hx Splenectomy or Spleen Trauma: No


Other PMH: Nephritis at young age, Rheumatic fever, Parkinsons.  tonsillectomy, 

appendectomy, tubes tied at 42, cosmetic surgery.





- Social History


Smoking Status: Never smoked


Constitutional: 


 Initial Vital Signs











Temperature (C)  36.7 C   04/02/18 09:53


 


Heart Rate  92   04/02/18 09:53


 


Respiratory Rate  16   04/02/18 09:53


 


Blood Pressure  94/55 L  04/02/18 09:53


 


O2 Sat (%)  100   04/02/18 09:53








 











O2 Delivery Mode               Room Air














Allergies/Adverse Reactions: 


 





No Known Allergies Allergy (Verified 01/06/18 18:12)


 








Home Medications: 














 Medication  Instructions  Recorded


 


Albuterol Sulfate [Proair Hfa] 1 puffs IH QID PRN 01/06/18


 


Carbidopa/Levodopa [Carbidopa-Levo 2 each PO TID 01/06/18





 mg Odt]  


 


Escitalopram Oxalate [Lexapro] 10 mg PO HS 01/06/18


 


Fluticasone Nasal [Flonase Nasal 1 sprays NASAL DAILY 01/06/18





Spray]  


 


Herbals/Supplements -Info Only 1 ea PO DAILY 01/06/18


 


Mirabegron [Myrbetriq] 25 mg PO DAILY 01/06/18


 


Rivastigmine [Exelon 4.6mg/24 1 each TD DAILY 01/06/18





hours]  














Medical Decision Making





- Diagnostics


Imaging Results: 


 Imaging Impressions





Abdomen CT  04/02/18 10:04


Impression:  


1. Small bowel obstruction with a transition point in the left abdomen, with 

possible ischemia involving small bowel in the left upper quadrant, with 

indeterminate high attenuation material in the jejunum in the right upper 

quadrant, which could be related to ingested material, wall thickening/enteritis

, or less likely hemorrhage.


2. Moderate ascites with layering hemorrhage in the pelvis consistent with 

hemoperitoneum.


3. Moderate hiatal hernia.


4. Additional findings as above.


 


Findings discussed with Vladimir Yeung MD 4/2/2018 at 12:25. 











Imaging: Discussed imaging studies w/ On call Radiologist, I viewed and 

interpreted images myself


ED Course/Re-evaluation: 





CHIEF COMPLAINT:  Dark vomiting, abdominal pain





HISTORY OF PRESENT ILLNESS:  The patient is an 83 y/o female with a history of 

Parkinson's disease arriving via EMS from [facility] with 

worsening nausea and vomiting over the last day. Emesis was initially clear, 

but has since become dark in color and EMS reports there was emesis everywhere 

in her apartment. She complains of associated LUQ pain. She received 12.5mg 

Phenergan en route for symptoms. History from patient limited due to somnolence 

and Parkinson's with decreased verbal. No anticoagulants listed on prior 

history. 





REVIEW OF SYSTEMS:  





Difficult to obtain due to presentation.





PHYSICAL EXAM:  





HR, BP 94/55, O2 Sat, RR.  Temp noted


General Appearance: Somnolent, well hydrated, mumbling speech, normal color.


Head:  Atraumatic without scalp tenderness or obvious injury


Eyes:  Pupils equal, round, reactive to light and accommodation, EOMI, no trauma

, no injection.


Ears:  Clear bilaterally, no perforation, normal landmarks


Nose:  Atraumatic, no rhinorrhea, clear.


Throat:  Mucus membranes moist.


Neck:  Supple, nontender, no lymphadenopathy.


Respiratory:  No retractions, no distress, no wheezes, and no accessory muscle 

use.  Lungs are clear to auscultation bilaterally.


Cardiovascular:  Regular rate and rhythm, no murmurs, rubs, or gallops. Good 

capillary refill all extremities.


Gastrointestinal:  Abdomen is soft, diffuse tenderness, non-distended, no masses

, no rebound, no guarding, no peritoneal signs.


Musculoskeletal:  Normal active ROM of all extremities, atraumatic.


Neurological:  Somnolent, mumbling speech, follows basic commands. 


Skin:  No rashes, good turgor, no nodules on palpation.





Past medical history: Parkinson's disease, rheumatic disease, kidney injury


Past surgical history: Tonsillectomy, appendectomy


Family history: Noncontributory


Social history: [facility]. DNR. 





Prior medical records reviewed including admission 01/7/18 for dizziness. Brain 

MRI at that time showed nothing acute. 





DIAGNOSTICS/PROCEDURES/CRITICAL CARE TIME:  





Abdominal CT: Small bowel obstruction with ischemia, inflamed duodenum/jejunum, 

ascites/hemoperitoneum





DIFFERENTIAL DIAGNOSIS:   The differential diagnosis for the patient's nausea 

and vomiting included but was not limited to gastroenteritis, gastritis, 

appendicitis, and medication side effect.





MEDICAL DECISION MAKING:  





This is an 83 y/o female with Parkinson's disease who presents with progressive 

nausea and vomiting that has turned dark in color this morning. She has diffuse 

tenderness on exam and is quite somnolent with mumbling speech, this could be 

due to her Parkinson's or the Phenergan she received en route. Her hypotension 

is likely baseline due to habitus and autonomic dysfunction from Parkinson's 

disease. Plan for IV, labs, abdominal CT. 1L IV NS and 4mg IV Zofran ordered. 





WBC elevated. CT shows bowel obstruction with ischemia. Surgery paged. 





1224: Consulted with Dr. Perez, surgeon. He will assess patient in the ED. 





Dr. Perze assessed patient and plans to take her to the OR. 1gm IV Invanz 

ordered.





- Data Points


Laboratory Results: 


 Laboratory Results





 04/02/18 10:10 





 04/02/18 10:10 





 











  04/02/18 04/02/18 04/02/18





  10:10 10:10 10:10


 


WBC      14.30 10^3/uL H 10^3/uL





     (3.80-9.50) 


 


RBC      4.53 10^6/uL 10^6/uL





     (4.18-5.33) 


 


Hgb      14.2 g/dL g/dL





     (12.6-16.3) 


 


Hct      42.4 % %





     (38.0-47.0) 


 


MCV      93.6 fL fL





     (81.5-99.8) 


 


MCH      31.3 pg pg





     (27.9-34.1) 


 


MCHC      33.5 g/dL g/dL





     (32.4-36.7) 


 


RDW      14.0 % %





     (11.5-15.2) 


 


Plt Count      270 10^3/uL 10^3/uL





     (150-400) 


 


MPV      9.0 fL fL





     (8.7-11.7) 


 


Neut % (Auto)      92.8 % H %





     (39.3-74.2) 


 


Lymph % (Auto)      3.7 % L %





     (15.0-45.0) 


 


Mono % (Auto)      3.1 % L %





     (4.5-13.0) 


 


Eos % (Auto)      0.0 % L %





     (0.6-7.6) 


 


Baso % (Auto)      0.1 % L %





     (0.3-1.7) 


 


Nucleat RBC Rel Count      0.0 % %





     (0.0-0.2) 


 


Absolute Neuts (auto)      13.26 10^3/uL H 10^3/uL





     (1.70-6.50) 


 


Absolute Lymphs (auto)      0.53 10^3/uL L 10^3/uL





     (1.00-3.00) 


 


Absolute Monos (auto)      0.45 10^3/uL 10^3/uL





     (0.30-0.80) 


 


Absolute Eos (auto)      0.00 10^3/uL L 10^3/uL





     (0.03-0.40) 


 


Absolute Basos (auto)      0.02 10^3/uL 10^3/uL





     (0.02-0.10) 


 


Absolute Nucleated RBC      0.00 10^3/uL 10^3/uL





     (0-0.01) 


 


Immature Gran %      0.3 % %





     (0.0-1.1) 


 


Immature Gran #      0.04 10^3/uL 10^3/uL





     (0.00-0.10) 


 


PT    15.3 SEC H SEC  





    (12.0-15.0)  


 


INR    1.19  H   





    (0.83-1.16)  


 


APTT    24.1 SEC SEC  





    (23.0-38.0)  


 


Sodium  137 mEq/L mEq/L    





   (135-145)   


 


Potassium  4.4 mEq/L mEq/L    





   (3.5-5.2)   


 


Chloride  99 mEq/L mEq/L    





   ()   


 


Carbon Dioxide  19 mEq/l L mEq/l    





   (22-31)   


 


Anion Gap  19 mEq/L H mEq/L    





   (8-16)   


 


BUN  34 mg/dL H mg/dL    





   (7-23)   


 


Creatinine  1.0 mg/dL mg/dL    





   (0.6-1.0)   


 


Estimated GFR  53     





    


 


Glucose  222 mg/dL H mg/dL    





   ()   


 


Calcium  8.9 mg/dL mg/dL    





   (8.5-10.4)   


 


Total Bilirubin  0.8 mg/dL mg/dL    





   (0.1-1.4)   


 


Conjugated Bilirubin  0.3 mg/dL mg/dL    





   (0.0-0.5)   


 


Unconjugated Bilirubin  0.5 mg/dL mg/dL    





   (0.0-1.1)   


 


AST  35 IU/L IU/L    





   (14-46)   


 


ALT  15 IU/L IU/L    





   (9-52)   


 


Alkaline Phosphatase  93 IU/L IU/L    





   ()   


 


Total Protein  7.3 g/dL g/dL    





   (6.3-8.2)   


 


Albumin  3.9 g/dL g/dL    





   (3.5-5.0)   


 


Lipase  105 IU/L IU/L    





   ()   











Medications Given: 


 








Discontinued Medications





Ertapenem (Invanz)  1 gm IVP EDNOW ONE


   PRN Reason: Protocol


   Stop: 04/02/18 12:27


   Last Admin: 04/02/18 12:37 Dose:  1 gm


Sodium Chloride (Ns)  1,000 mls @ 0 mls/hr IV EDNOW ONE; Wide Open


   PRN Reason: Protocol


   Stop: 04/02/18 10:03


   Last Admin: 04/02/18 10:19 Dose:  1,000 mls


Ondansetron HCl (Zofran)  4 mg IVP EDNOW ONE


   Stop: 04/02/18 10:03


   Last Admin: 04/02/18 10:20 Dose:  4 mg








Departure





- Departure


Disposition: [facility]


Clinical Impression: 


Bowel obstruction


Qualifiers:


 Intestinal obstruction type: other intestinal obstruction Intestinal 

obstruction extent: unspecified extent Qualified Code(s): K56.699 - Other 

intestinal obstruction unspecified as to partial versus complete obstruction





Condition: Fair


Report Scribed for: Vladimir Yeung


Report Scribed by: Мария Rendon


Date of Report: 04/02/18


Time of Report: 10:20

## 2018-04-02 NOTE — POSTANESTH
Post Anesthetic Evaluation


Cardiovascular Status: Similar to Pre-Op Cond, Tx Hyper/Hypo-tension (Patient 

hypotensive in ED and arrival to OCC.  Continues to receive fluids in PACU.)


Respiratory Status: Similar to Pre-op Cond., Tx Decrease in SpO2 (Supplemental 

O2)


Level of Consciousness/Mental Status: Mildly Sleepy, Arousable


Pain Control: Adequate, Prn Tx Ordered


Nausea/Vomiting Control: Adequate, Prn Tx Ordered


Complications Possibly Related to Anesthesia: Other, See Comments (Patient 

extremely "rigid" after surgery.  Per discussion with POA, patient had not 

received her Carbidopa/Levodopa medication today.  First dose ordered by 

anesthesiologist for PACU down NG tube.)

## 2018-04-02 NOTE — POSTOPPROG
Post Op Note


Date of Operation: 04/02/18


Surgeon: Jimenez Perez


Assistant: Molly Augustin


Anesthesiologist: Charly Solitario 


Anesthesia: GET(General Endotracheal)


Pre-op Diagnosis: SBO, possible bowel ischemia


Post-op Diagnosis: same, single adhesive band, bowel ischemia


Procedure: laparotomy w lysis of adhesion and small bowel resection


Findings: single adhesive band, about 16 inches of infarcted small bowel 

resected


Inf/Abcess present in the surg proc area at time of surgery?: No


EBL: Minimal


Complications: 


none


Specimen(s): 


infarcted small bowel to pathology

## 2018-04-03 LAB — PLATELET # BLD: 159 10^3/UL (ref 150–400)

## 2018-04-03 RX ADMIN — FLUTICASONE PROPIONATE SCH: 50 SPRAY, METERED NASAL at 08:11

## 2018-04-03 RX ADMIN — CARBIDOPA AND LEVODOPA SCH TAB: 25; 100 TABLET ORAL at 11:45

## 2018-04-03 RX ADMIN — CARBIDOPA AND LEVODOPA SCH TAB: 25; 100 TABLET ORAL at 17:04

## 2018-04-03 RX ADMIN — LEVALBUTEROL INHALATION 0.63MG/3ML SCH MG: 0.63 SOLUTION RESPIRATORY (INHALATION) at 15:48

## 2018-04-03 RX ADMIN — ERTAPENEM SODIUM SCH GM: 1 INJECTION, POWDER, LYOPHILIZED, FOR SOLUTION INTRAMUSCULAR; INTRAVENOUS at 09:30

## 2018-04-03 RX ADMIN — CARBIDOPA AND LEVODOPA SCH TAB: 25; 100 TABLET ORAL at 08:11

## 2018-04-03 RX ADMIN — LEVALBUTEROL INHALATION 0.63MG/3ML SCH MG: 0.63 SOLUTION RESPIRATORY (INHALATION) at 20:10

## 2018-04-03 NOTE — WOCRNPDOC
WOCRN Advanced Assessment Note





- Skin Integrity Problem, Advanced Assess





  ** Left Medial Buttock Pressure Injury


Dressing Type: Mepilex Border


Dressing Description: Intact


Exudate Amount: None


Exudate Characteristic(s): None


Integumentary Issue Intervention: Visualized Under Dressing


Thelma Wound Tissue: Blanching, Erythema


Thelma Wound Swelling: None


Wound Bed Color: Red


Site Measurement - Head-to-Toe Length X Width X Depth (cm): [id number]


Pressure Injury Stage: Stage 1


Pressure Injury Present on Admit: Yes


Skin Integrity Problem Comment: Small, discrete area of non-blanching erythema 

on patient's L medial buttock, consistent w/ stage 1 pressure injury. No 

erythema or swelling periwound. Injury not over a bony prominence, uncertain of 

etiology, yet clearly non-blanching. Re-covered w/ existing dressing, and 

placed orders for pressure-relieving support surface and turns q2 side to side. 

Report given to Staff ECHO Tinajero.

## 2018-04-03 NOTE — ASMTCMCOM
CM Note

 

CM Note                       

Notes:

82yr old female admitted for abdominal pain, N/V, Dehydration, Emesis, Small bowel obs. Patient 

also has a wound on her coccyx. She has a Hx of Nephritis. Patient lives at [facility] and has 

a friend [place] who is her MPOA. Therapies to eval for discharge needs. CM to follow.

 

Date Signed:  04/03/2018 03:18 PM

Electronically Signed By:Fidelia Palacios LCSW

## 2018-04-03 NOTE — PDHOSCONS
History and Physical





- Chief Complaint


Hypotension, medical management





- History of Present Illness








Source-patient is seen in the ICU postoperatively.  She it remains quite 

somnolent and will open her eyes but does not really follow commands.  EMR was 

reviewed and case discussed with RN.





ALBERT Wing is an 82-year-old female with past medical history significant for 

Parkinson's disease, history rheumatic fever, history of nephritis who presents 

emergency department from [facility] with 1 day history 

of nausea vomiting and worsening abdominal pain.  Patient without any reported 

bloody or coffee-ground emesis although it was noted her emesis became darker 

in color.  Patient had complained of some left upper quadrant abdominal pain.  

She underwent CT abdomen pelvis which showed a small-bowel obstruction with 

possible ischemia, hemo peritoneum and a hiatal hernia.





Patient was taken to the OR and underwent an ex lap with bowel resection and 

reanastomosis.  Postoperatively it is reported that patient had some 

difficulties clearing her anesthesia and was given a dose of Narcan with some 

improvement in her mentation.  She remains somnolent however.  Patient's blood 

pressures when she arrived to the ED were low very low normal and have 

persisted systolic Katie in the 80s to 90s.  Per review of chart patient had a 

hospitalization for dehydration and dizziness in January of 2018 and her 

historical vital signs show a baseline SBP in the 130s.  Patient has not had 

any tachycardia, fevers or declined urine output.  She received 2 L of IV fluid 

in the emergency department preoperatively.  She has also received 2 500 cc 

boluses of albumin in the ICU.  She continues to receive normal saline with 

potassium at 125 mL/hour.  A repeat H&H this evening did show a decline in her 

blood count but not sufficient to meet criteria for transfusion.





History Information





- Allergies/Home Medication List


Allergies/Adverse Reactions: 








No Known Allergies Allergy (Verified 01/06/18 18:12)


 





Home Medications: 








Albuterol Sulfate [Proair Hfa] 1 puffs IH QID PRN 01/06/18 [Last Taken Unknown]


Escitalopram Oxalate [Lexapro] 10 mg PO HS 01/06/18 [Last Taken 04/01/18]


Fluticasone Nasal [Flonase Nasal Spray] 1 sprays NASAL DAILY 01/06/18 [Last 

Taken 04/02/18]


Mirabegron [Myrbetriq] 25 mg PO DAILY 01/06/18 [Last Taken 04/02/18]


Rivastigmine [Exelon 4.6mg/24 hours] 1 each TD DAILY 01/06/18 [Last Taken 04/02/ 18]


Carbidopa/Levodopa 25/100Mg [Sinemet 25/100 MG (*)] 1 tab PO TID@08,12,16 04/02/ 18 [Last Taken 04/02/18 12:00]





I have personally reviewed and updated: family history, medical history, social 

history, surgical history





- Past Medical History


Additional medical history: Parkinson's Disease, childhood rheumatic fever, 

nephritis





- Surgical History


Additional surgical history: Tonsillectomy and adenoidectomy, appendectomy, BTL

, cosmetic surgery and now status post ex lap with small-bowel resection





- Family History


Positive for: cancer





- Social History


Smoking Status: Never smoked


Alcohol Use: None


Drug Use: None


Additional social history: Patient resides at [facility].  Cor 

status is DNR DNI with copy advanced directive in chart.





Review of Systems


Review of Systems: 








Unable to obtain secondary to patient's somnolence.





Physical Exam


Physical Exam: 








 Selected Entries











  04/02/18





  09:53


 


Blood Pressure Automatic





Method 


 


Heart Rate 92


 


Respiratory 16





Rate 


 


O2 Sat (%) 100


 


Temperature (C) 36.7 C


 


Blood Pressure 94/55 L


 


Mean Arterial 68





Pressure (MAP) 


 


O2 Delivery Room Air





Mode 


 


Temperature Oral





Source 




















Temp Pulse Resp BP Pulse Ox


 


 37.1 C   88   15   84/62 L  98 


 


 04/03/18 00:00  04/03/18 01:11  04/03/18 01:11  04/03/18 01:11  04/03/18 01:11




















O2 (L/minute)                  2














Constitutional: no apparent distress, chronically ill appearing, other (NAD.  

Frail chronically ill-appearing elderly female is lying quietly in bed.  She 

does moan and stirred very slightly but otherwise is nonverbal.), No 

uncomfortable


Eyes: other (Patient keeps her eyes closed.  Visualization of her pupils 

attempted but resisted.)


Ears, Nose, Mouth, Throat: no oral mucosal ulcers, dry mucous membranes, other (

Mask in place.  No nasal discharge.)


Cardiovascular: regular rate and rhythym, pulses symmetric bilaterally, other (

Slightly distant heart sounds.), No systolic murmur, No edema


Peripheral Pulses: 2+: dorsalis-pedis (R), dorsalis-pedis (L)


Respiratory: no respiratory distress, reduced air movement (Diminished 

bibasilarly.), No expiratory wheeze, No inspiratory crackles


Gastrointestinal: other (Few bowel sounds present.  Abdomen bandaged.  Soft.), 

No guarding


Genitourinary: no bladder tenderness, montero in urethra


Skin: warm, erythema (Sacral coccygeal erythema.  Small area of blanching 

coccygeal), other (Pallor)


Musculoskeletal: other


Neurologic: other (somnolent), No facial droop


Psychiatric: other (somnolent)





Lab Data & Imaging Review





 04/02/18 23:59





 04/02/18 10:10














WBC  14.30 10^3/uL (3.80-9.50)  H  04/02/18  10:10    


 


RBC  4.53 10^6/uL (4.18-5.33)   04/02/18  10:10    


 


Hgb  9.8 g/dL (12.6-16.3)  L  04/02/18  23:59    


 


Hct  29.4 % (38.0-47.0)  L D 04/02/18  23:59    


 


MCV  93.6 fL (81.5-99.8)   04/02/18  10:10    


 


MCH  31.3 pg (27.9-34.1)   04/02/18  10:10    


 


MCHC  33.5 g/dL (32.4-36.7)   04/02/18  10:10    


 


RDW  14.0 % (11.5-15.2)   04/02/18  10:10    


 


Plt Count  270 10^3/uL (150-400)   04/02/18  10:10    


 


MPV  9.0 fL (8.7-11.7)   04/02/18  10:10    


 


Neut % (Auto)  92.8 % (39.3-74.2)  H  04/02/18  10:10    


 


Lymph % (Auto)  3.7 % (15.0-45.0)  L  04/02/18  10:10    


 


Mono % (Auto)  3.1 % (4.5-13.0)  L  04/02/18  10:10    


 


Eos % (Auto)  0.0 % (0.6-7.6)  L  04/02/18  10:10    


 


Baso % (Auto)  0.1 % (0.3-1.7)  L  04/02/18  10:10    


 


Nucleat RBC Rel Count  0.0 % (0.0-0.2)   04/02/18  10:10    


 


Absolute Neuts (auto)  13.26 10^3/uL (1.70-6.50)  H  04/02/18  10:10    


 


Absolute Lymphs (auto)  0.53 10^3/uL (1.00-3.00)  L  04/02/18  10:10    


 


Absolute Monos (auto)  0.45 10^3/uL (0.30-0.80)   04/02/18  10:10    


 


Absolute Eos (auto)  0.00 10^3/uL (0.03-0.40)  L  04/02/18  10:10    


 


Absolute Basos (auto)  0.02 10^3/uL (0.02-0.10)   04/02/18  10:10    


 


Absolute Nucleated RBC  0.00 10^3/uL (0-0.01)   04/02/18  10:10    


 


Immature Gran %  0.3 % (0.0-1.1)   04/02/18  10:10    


 


Immature Gran #  0.04 10^3/uL (0.00-0.10)   04/02/18  10:10    


 


PT  15.3 SEC (12.0-15.0)  H  04/02/18  10:10    


 


INR  1.19  (0.83-1.16)  H  04/02/18  10:10    


 


APTT  24.1 SEC (23.0-38.0)   04/02/18  10:10    


 


Sodium  137 mEq/L (135-145)   04/02/18  10:10    


 


Potassium  4.4 mEq/L (3.5-5.2)   04/02/18  10:10    


 


Chloride  99 mEq/L ()   04/02/18  10:10    


 


Carbon Dioxide  19 mEq/l (22-31)  L  04/02/18  10:10    


 


Anion Gap  19 mEq/L (8-16)  H  04/02/18  10:10    


 


BUN  34 mg/dL (7-23)  H  04/02/18  10:10    


 


Creatinine  1.0 mg/dL (0.6-1.0)   04/02/18  10:10    


 


Estimated GFR  53   04/02/18  10:10    


 


Glucose  222 mg/dL ()  H  04/02/18  10:10    


 


Calcium  8.9 mg/dL (8.5-10.4)   04/02/18  10:10    


 


Total Bilirubin  0.8 mg/dL (0.1-1.4)   04/02/18  10:10    


 


Conjugated Bilirubin  0.3 mg/dL (0.0-0.5)   04/02/18  10:10    


 


Unconjugated Bilirubin  0.5 mg/dL (0.0-1.1)   04/02/18  10:10    


 


AST  35 IU/L (14-46)   04/02/18  10:10    


 


ALT  15 IU/L (9-52)   04/02/18  10:10    


 


Alkaline Phosphatase  93 IU/L ()   04/02/18  10:10    


 


Total Protein  7.3 g/dL (6.3-8.2)   04/02/18  10:10    


 


Albumin  3.9 g/dL (3.5-5.0)   04/02/18  10:10    


 


Lipase  105 IU/L ()   04/02/18  10:10    











Assessment & Plan


Assessment: 








81 yo F with pmhx significant for Parkinson admitted with SBO s/p ex lap with 

bowel resection/anastomosis. Hospitalist Consulted for assistance with medical 

management/hypotension








#hypotension - Patient presented to ED with low normal BPs and have persisted 

SBP 80s.  s/p 2 liters IVF pre-op and now s/p total 1000 mls albumin.  No 

significant changes in BP since arrival from PACU.  Will continue with fluid 

resuscitation.  Chest x-ray showing some right upper lobe right middle lobe 

infiltrates this possibly could represent some aspiration pneumonia and history 

of acute nausea vomiting.  Patient currently receiving Invanz for her intra-

abdominal process and this should be adequate for suspected aspiration 

pneumonia.  Echocardiogram in the morning.  Patient without any previous noted 

history of CHF.  Patient with an acute drop of H&H of likely some component of 

dilution effect after receiving 2 L preoperatively.  Patient had minimal EBL 

noted on brief postop note.  Patient also with noted ascites on initial imaging 

and given findings of ischemic bowel will need to monitor for head fluid 

shifts.  Patient with limited but minimally improved response to bolus dosing 

of albumin or additional normal saline.  Her heart rate is within normal limits 

and she is not on a beta-blocker.  Patient also appears to be having slow 

recovery from anesthesia home blood pressures could be response to this as 

well.  Will hold off on pressor support at this time and continue with IV fluid 

support.





#AMS - likely 2/2 anesthesia and sedation.  Clearance may be inhibited in this 

elderly lady with CKD.  patient required dosing of narcan in PACU with 

temporary improvement in mentation. 





#Bowel obstruction - POD #1 s/p lysis of adhesions and small bowel resection. 

NGT in place.  Plan as per primary team





#Right PNA - possibly aspiration - patient without hypoxia and saturating well 

on 1 L supplemental oxygen.  She remains afebrile.  She is currently on Invanz.

  Repeat chest x-ray.





#Hyperglycemia - low-dose insulin sliding scale.  No previous history of 

diabetes.  Will monitor Accu-Cheks.  Patient currently NPO with NG tube in 

place.





# leukocytosis - likely secondary to patient's small bowel obstruction and 

ischemic bowel.  Will plan to repeat CBC in the a.m..





#Parkinson disease - continue carbidopa levodopa.





# CKD stage 3 - patient appears to be close to her baseline with creatinine 

1.0.  Will monitor renal function in the morning.





# hiatal hernia - NG tube in place.  GI prophylaxis.  





FEN - IVF to continue.  Electrolyte monitoring and replacement if needed.  

Patient NPO with NG tube in place as per primary team.


PPX-SCDs.  Anticoagulation as per primary team.  Repeating serial H&H at this 

time.


Cor status-is DNR DNI as per patient's MOLST form.


Disposition-patient has been admitted inpatient status is acutely ill in the 

ICU and a Bulgarian requires a greater than 2 midnight stay.

## 2018-04-03 NOTE — HOSPPROG
Hospitalist Progress Note


Assessment/Plan: 





83 yo F with pmhx significant for Parkinson admitted with SBO s/p ex lap with 

bowel resection/anastomosis. Hospitalist Consulted for assistance with medical 

management/hypotension








#hypotension - improved with fluid resuscitation and suspect this is largely 

related to volume as well as anesthesia and perhaps contribuation of autonomic 

instability due to PD. Monitoring but appears resoved. 


#AMS - likely 2/2 anesthesia and sedation.  Clearance may be inhibited in this 

elderly lady with CKD.  Susepct some level of dementia at baseline per family 

and seems to be nearing basline now





#Bowel obstruction - POD #1 s/p lysis of adhesions and small bowel resection. 

NGT in place.  Plan as per primary team





#Right PNA - possibly aspiration - patient without hypoxia and saturating well 

on 1 L supplemental oxygen.  She remains afebrile.  She is currently on Invanz.

  Repeat chest x-ray.





#Hyperglycemia - low-dose insulin sliding scale.  No previous history of 

diabetes.  Will monitor Accu-Cheks.  Patient currently NPO with NG tube in 

place.





# leukocytosis - likely secondary to patient's small bowel obstruction and 

ischemic bowel.  Will plan to repeat CBC in the a.m..





#Parkinson disease - continue carbidopa levodopa.





# CKD stage 3 - patient appears to be close to her baseline with creatinine 

1.0.  Will monitor renal function in the morning.





# hiatal hernia - NG tube in place.  GI prophylaxis.  





FEN - IVF to continue.  Electrolyte monitoring and replacement if needed.  

Patient NPO with NG tube in place as per primary team.


PPX-SCDs.  Anticoagulation as per primary team.  Repeating serial H&H at this 

time.


Cor status-is DNR DNI as per patient's MOLST form.


Objective: 


 Vital Signs











Temp Pulse Resp BP Pulse Ox


 


 36.4 C   93   19   113/58 L  92 


 


 04/03/18 08:00  04/03/18 12:00  04/03/18 12:00  04/03/18 12:00  04/03/18 12:00








 Laboratory Results





 04/03/18 04:05 





 04/03/18 04:05 





 











 04/02/18 04/03/18 04/04/18





 05:59 05:59 05:59


 


Intake Total  6461 


 


Output Total  1265 200


 


Balance  5196 -200








 











PT  15.3 SEC (12.0-15.0)  H  04/02/18  10:10    


 


INR  1.19  (0.83-1.16)  H  04/02/18  10:10    














ICD10 Worksheet


Patient Problems: 


 Problems











Problem Status Onset


 


Dizziness Acute  


 


Dehydration Acute  


 


Nausea Acute  


 


Parkinsons disease Acute  


 


Bowel obstruction Acute

## 2018-04-03 NOTE — SOAPPROG
SOAP Progress Note


Assessment/Plan: 


Assessment:


wound ok/ afebrile/ talkative but confused at times/  abd soft, nontender, some 

bs


labs ok


uo ok, still large ng out

















Plan:continue ng suction





04/03/18 16:13





Objective: 





 Vital Signs











Temp Pulse Resp BP Pulse Ox


 


 36.4 C   93   16   113/58 L  94 


 


 04/03/18 08:00  04/03/18 15:45  04/03/18 15:45  04/03/18 12:00  04/03/18 15:45








 Laboratory Results





 04/03/18 04:05 





 04/03/18 04:05 





 











 04/02/18 04/03/18 04/04/18





 05:59 05:59 05:59


 


Intake Total  6461 


 


Output Total  1265 200


 


Balance  5196 -200








 











PT  15.3 SEC (12.0-15.0)  H  04/02/18  10:10    


 


INR  1.19  (0.83-1.16)  H  04/02/18  10:10    














ICD10 Worksheet


Patient Problems: 


 Problems











Problem Status Onset


 


Bowel obstruction Acute  


 


Dehydration Acute  


 


Dizziness Acute  


 


Nausea Acute  


 


Parkinsons disease Acute

## 2018-04-03 NOTE — ECHO
[email or web address]





[address] [address] 

Main: [phone] 



Fax: 



Transthoracic Echocardiogram 

Name:             NESSA LOPEZ                             MR#:

[id number]

Study Date:       2018                              Study Time:

08:00 AM

YOB: 1935                              Age:

82 year(s)

Height:           162.6 cm (64 in.)                       Weight:

63.05 kg (139 lb.)

BSA:              1.68 m2                                 Gender:

Female

Examination:      Echo                                    Indication:

Hypotension, post op

Image Quality:                                            Contrast: 

Requested by:     Darcy Bishop                            BP:

102 mmHg/61 mmHg

Heart Rate:                                               Rhythm: 

Indication:       Hypotension, post op 



Procedure Staff 

Ultrasound Technician:   Maddi Carlson RDCS 

Reading Physician:       Tanvi Ford MD 

Requesting Provider: 



Conclusions:          Normal size left ventricle.  

No LV hypertrophy.  

Normal global systolic LV function.  

The ejection fraction is estimated to be 65-70 %.  

No regional wall motion abnormality.  

Normal size right ventricle.  

Normal RV function.  

Mild tricuspid regurgitation is present.  

RVSP is 41mmHG..  

There is no previous echocardiogram for comparison. 



Measurements: 

Chambers                    Valvular Assessment AV/MV

Valvular Assessment TV/PV



Normal                                  Normal

Normal

Name         Value    Range              Name         Value Range

Name          Value Range

Ao Selma (MM): 3.7 cm   (2.2 cm-3.7            AV meanP mmHg ( - )

TR Vmax:      3.01 mm/s ( - )



cm)                MV E Vmax:   0.99 m/s ( - )        TR PGmax:     36

mmHg ( - )

IVSd (2D):   0.8 cm (0.6 cm-1.1              MV A Vmax:   0.76 m/s ( -

)        syst. PAP: 41 mmHg ( - )



cm)                MV E/A:      1.30 ( - )  

LVDd (2D):   4.4 cm   (3.9 cm-5.3 



cm)   

LVDs (2D):   2.8 cm   (2.1 cm-4 



cm)   

LVPWd (2D):  0.8 cm   ( - )   

LVEF (MOD4): 76 %     (>=55 %)   

EF Range:    65-70 % 



Continued Measurements: 

Chambers                    Valvular Assessment AV/MV

Valvular Assessment TV/PV



Patient: NESSA LOPEZ                          MRN: [id number]

Study Date: 2018   Page 1 of 2

08:00 AM 









Name                       Value  Name                      Value

Name                      Value

LADs:                   3.9 cm               MV E/E' Septal:

14.50     CVP (est.):             5 mmHg

LADs Lon.4 cm               MV E/E' Lateral:

11.80

LA Area:                22.4 cm2   



Findings:             Left Ventricle: 

Normal size left ventricle. No LV hypertrophy. Normal global systolic

LV function. The ejection

fraction is estimated to be 65-70 %. No regional wall motion

abnormality.

Right Ventricle: 

Normal size right ventricle. Normal RV function.  

Left Atrium: 

The left atrium is mildly dilated. Atrial septal bowing from left to

right.

Right Atrium: 

The right atrium is normal in size.  

Mitral Valve: 

The mitral valve is normal in appearance and function.  

Aortic Valve: 

The aortic valve is normal in appearance and function.  

Tricuspid Valve: 

The tricuspid valve is normal in appearance and function. Mild

tricuspid regurgitation is present.

RVSP is 41mmHG..  

Pulmonic Valve: 

Pulmonary valve not well visualized.  

Aorta: 

The aorta is normal.  

Pericardium: 

No pericardial effusion. There is pericardial fat. 







Electronically signed by Tanvi Ford MD on 2018 at 09:26 AM 

(No Signature Object) 



Patient: NESSA LOPEZ                          MRN: [id number]

Study Date: 2018   Page 2 of 2

08:00 AM 







[id number]

## 2018-04-04 LAB — PLATELET # BLD: 153 10^3/UL (ref 150–400)

## 2018-04-04 RX ADMIN — CARBIDOPA AND LEVODOPA SCH TAB: 25; 100 TABLET ORAL at 12:33

## 2018-04-04 RX ADMIN — SODIUM CHLORIDE AND POTASSIUM CHLORIDE SCH MLS: 9; 1.49 INJECTION, SOLUTION INTRAVENOUS at 00:55

## 2018-04-04 RX ADMIN — LEVALBUTEROL INHALATION 0.63MG/3ML SCH MG: 0.63 SOLUTION RESPIRATORY (INHALATION) at 05:42

## 2018-04-04 RX ADMIN — LEVALBUTEROL INHALATION 0.63MG/3ML SCH MG: 0.63 SOLUTION RESPIRATORY (INHALATION) at 20:52

## 2018-04-04 RX ADMIN — SODIUM CHLORIDE AND POTASSIUM CHLORIDE SCH MLS: 9; 1.49 INJECTION, SOLUTION INTRAVENOUS at 21:21

## 2018-04-04 RX ADMIN — LEVALBUTEROL INHALATION 0.63MG/3ML SCH MG: 0.63 SOLUTION RESPIRATORY (INHALATION) at 11:23

## 2018-04-04 RX ADMIN — LEVALBUTEROL INHALATION 0.63MG/3ML SCH: 0.63 SOLUTION RESPIRATORY (INHALATION) at 16:53

## 2018-04-04 RX ADMIN — CARBIDOPA AND LEVODOPA SCH TAB: 25; 100 TABLET ORAL at 16:23

## 2018-04-04 RX ADMIN — Medication SCH MLS: at 21:21

## 2018-04-04 RX ADMIN — Medication SCH MLS: at 00:55

## 2018-04-04 RX ADMIN — SODIUM CHLORIDE AND POTASSIUM CHLORIDE SCH MLS: 9; 1.49 INJECTION, SOLUTION INTRAVENOUS at 11:13

## 2018-04-04 RX ADMIN — Medication SCH MLS: at 08:10

## 2018-04-04 RX ADMIN — CARBIDOPA AND LEVODOPA SCH TAB: 25; 100 TABLET ORAL at 08:09

## 2018-04-04 RX ADMIN — ENOXAPARIN SODIUM SCH MG: 100 INJECTION SUBCUTANEOUS at 08:10

## 2018-04-04 RX ADMIN — FLUTICASONE PROPIONATE SCH SPRAY: 50 SPRAY, METERED NASAL at 10:35

## 2018-04-04 RX ADMIN — ERTAPENEM SODIUM SCH GM: 1 INJECTION, POWDER, LYOPHILIZED, FOR SOLUTION INTRAMUSCULAR; INTRAVENOUS at 10:35

## 2018-04-04 NOTE — ASMTCMCOM
CM Note

 

CM Note                       

Notes:

I spoke with patient's (private pay) caregiver Merlene. She is with patient for 3hrs/day, 5 

days/week. Patient also gets help in AM and PM from [facility] staff. Per Merlene, she can scale 

up her assistance from [place]. I called her MARYOA Argentina to discuss further; I left a voice mail.



I sent a referral to [facility]. They can provide skilled care upon discharge. Per 

Merlene, this patient would do better at home vs SNF, but we will continue to assess daily. 

 

Date Signed:  04/04/2018 02:38 PM

Electronically Signed By:Merlyn Linares RN

## 2018-04-04 NOTE — HOSPPROG
Hospitalist Progress Note


Assessment/Plan: 





*  SBO s/p resection large segment infarcted small bowel


   -clears


*  Aspiration PNA


   -IV Invanz


*  Very high percentage of bands - 52%


      -consider repeat CT A/P before discharge (? layering hemoperitoneum)


*  Parkinson's 


   -Sinemet


*  ABL anemia


   -follow




















Subjective: Tolerating clears


Objective: 


 Vital Signs











Temp Pulse Resp BP Pulse Ox


 


 36.6 C   87   18   106/59 L  94 


 


 04/04/18 11:57  04/04/18 11:57  04/04/18 11:57  04/04/18 11:57  04/04/18 11:57








 Laboratory Results





 04/04/18 04:55 





 04/04/18 04:55 





 











 04/03/18 04/04/18 04/05/18





 05:59 05:59 05:59


 


Intake Total 6461 619 100


 


Output Total 1265 1150 


 


Balance 5196 -531 100








 











PT  15.3 SEC (12.0-15.0)  H  04/02/18  10:10    


 


INR  1.19  (0.83-1.16)  H  04/02/18  10:10    








CXR viewed, my personal interpretation is - RUL infiltrate


ct abd pelvis - SBO with hemoperitoneum vs. ascites





- Physical Exam


Constitutional: no apparent distress, appears nourished, not in pain


Cardiovascular: regular rate and rhythym, no murmur, rub, or gallop


Respiratory: no respiratory distress, no rales or rhonchi, clear to auscultation


Gastrointestinal: normoactive bowel sounds, soft, non-tender abdomen, no 

palpable masses


Skin: no rashes or abrasions, no fluctuance, no induration


Neurologic: AAOx3, sensation intact bilaterally


Psychiatric: interacting appropriately, not anxious, not encephalopathic, 

thought process linear





ICD10 Worksheet


Patient Problems: 


 Problems











Problem Status Onset


 


Bowel obstruction Acute  


 


Dehydration Acute  


 


Dizziness Acute  


 


Nausea Acute  


 


Parkinsons disease Acute

## 2018-04-04 NOTE — ASMTCMCOM
CM Note

 

CM Note                       

Notes:

Spoke with patient's JAI Elaine who also advocates for patient to go back to [facility]. She 

agrees with patient's caregiver Merlene that they can utilize [facility] services (adding care at 

night), as well as skilled care. I have sent a referral to [facility], and they 

anticipate accepting patient. Case Management will follow. 

 

Date Signed:  04/04/2018 03:30 PM

Electronically Signed By:Merlyn Linares RN

## 2018-04-04 NOTE — SOAPPROG
SOAP Progress Note


Assessment/Plan: 


Assessment/Plan: 82 Y F c Parkinson's s/p exlap for SBO with resection of about 

16 inches of infarcted bowel. POD#2. Probable aspiration PNA likely during 

induction. 





Abdomen is doing well. +BS. Wounds intact. Passing gas. Appreciate SLP input 

and swallow study. Will start clears. 


Aspiration PNA. On invanz. Pulm and IM following--discussed with both this am. 





S: passing gas. pain controlled. hoarse voice but improving per pt and 

caregiver. always soft spoken they say.


O:


alert, nad


basilar crackles


rrr


abd soft, +BS, inc intact, appropriately ttp








04/04/18 11:57





Objective: 





 Vital Signs











Temp Pulse Resp BP Pulse Ox


 


 37.2 C   86   14   116/66   93 


 


 04/04/18 07:29  04/04/18 11:28  04/04/18 07:29  04/04/18 07:29  04/04/18 11:28








 Laboratory Results





 04/04/18 04:55 





 04/04/18 04:55 





 











 04/03/18 04/04/18 04/05/18





 05:59 05:59 05:59


 


Intake Total 6461 619 


 


Output Total 1265 1150 


 


Balance 5196 -531 








 











PT  15.3 SEC (12.0-15.0)  H  04/02/18  10:10    


 


INR  1.19  (0.83-1.16)  H  04/02/18  10:10    














ICD10 Worksheet


Patient Problems: 


 Problems











Problem Status Onset


 


Bowel obstruction Acute  


 


Dehydration Acute  


 


Dizziness Acute  


 


Nausea Acute  


 


Parkinsons disease Acute

## 2018-04-04 NOTE — SOAPPROG
SOAP Progress Note


Assessment/Plan: 


Assessment:





Status post small-bowel obstruction with ischemia.  Adhesions lysed comma 

ischemic bowel resected.  Doing well postoperatively.  Bowel sounds returning, 

starting to take p.o.s, passing gas, etc.





Aspiration pneumonia.  Improving clinically.  Chest x-ray lagging behind.  On 1 

L.  No significant cough or mucus.  No respiratory distress at this point.  On 

Invanz, bronchodilator therapies.





History of Parkinson's.  Stable.  Passed bedside swallow evaluation.





Anemia.  Secondary to surgery, rehydration, etc.  Hematocrit 25. No evidence of 

active bleeding.  Follow.





Metabolic:  No issues identified currently.








Plan:  Continue care including antibiotics and bronchopulmonary treatments.  

Increase mobilization as tolerated.  Advance diet as tolerated.  Follow 

laboratory.





25 minutes of critical care time spent directly with the patient.  Discussed 

with nursing, respiratory, Hospitalist, and the ICU multi disciplinary team.





Subjective: 





Feels okay.  Up in the chair.  Denies significant abdominal discomfort.  Denies 

shortness of breath.  Passing gas, starting to take more p.o. Intake


Objective: 





 Vital Signs











Temp Pulse Resp BP Pulse Ox


 


 36.3 C   82   14   141/78 H  95 


 


 04/04/18 16:14  04/04/18 16:14  04/04/18 16:14  04/04/18 16:14  04/04/18 16:14








 Laboratory Results





 04/04/18 04:55 





 04/04/18 04:55 





 











 04/03/18 04/04/18 04/05/18





 05:59 05:59 05:59


 


Intake Total 6461 619 2350


 


Output Total 1265 1150 1050


 


Balance 5196 -531 1300








 











PT  15.3 SEC (12.0-15.0)  H  04/02/18  10:10    


 


INR  1.19  (0.83-1.16)  H  04/02/18  10:10    











 Laboratory Tests











  04/04/18





  04:55


 


Calcium  8.5


 


Magnesium  2.2


 


Total Bilirubin  0.9


 


AST  98 H


 


ALT  41


 


Albumin  3.0 L








CXR:  Patchy bilateral infiltrates persist right upper lobe and left lower lobe 

primarily.  Left perihilar area as well.  Increased aeration.





Physical Exam





- Physical Exam


General Appearance: alert, no apparent distress


EENT: other (Nasal cannula at 1 L)


Neck: normal inspection


Respiratory: lungs clear (Anteriorly), decreased breath sounds (At bases), 

rales (Bibasilar rales present), No rhonchi, No wheezing


Cardiac/Chest: regular rate, rhythm, systolic murmur, No gallop


Abdomen: normal bowel sounds (Bowel sounds present), soft, distended (Less 

distended), No non-tender (Decreased tenderness)


Skin: warm/dry, pallor


Extremities: No pedal edema


Neuro/Psych: no motor/sensory deficits, No cognition abnormalities (Forgetful, 

hard of hearing, tremor)





ICD10 Worksheet


Patient Problems: 


 Problems











Problem Status Onset


 


Bowel obstruction Acute  


 


Dehydration Acute  


 


Dizziness Acute  


 


Nausea Acute  


 


Parkinsons disease Acute

## 2018-04-05 LAB — PLATELET # BLD: 158 10^3/UL (ref 150–400)

## 2018-04-05 RX ADMIN — ENOXAPARIN SODIUM SCH MG: 100 INJECTION SUBCUTANEOUS at 08:53

## 2018-04-05 RX ADMIN — Medication SCH MLS: at 08:53

## 2018-04-05 RX ADMIN — CARBIDOPA AND LEVODOPA SCH TAB: 25; 100 TABLET ORAL at 12:20

## 2018-04-05 RX ADMIN — LEVALBUTEROL INHALATION 0.63MG/3ML SCH MG: 0.63 SOLUTION RESPIRATORY (INHALATION) at 15:50

## 2018-04-05 RX ADMIN — AMOXICILLIN AND CLAVULANATE POTASSIUM SCH MG: 875; 125 TABLET, FILM COATED ORAL at 20:26

## 2018-04-05 RX ADMIN — FLUTICASONE PROPIONATE SCH SPRAY: 50 SPRAY, METERED NASAL at 08:54

## 2018-04-05 RX ADMIN — ERTAPENEM SODIUM SCH GM: 1 INJECTION, POWDER, LYOPHILIZED, FOR SOLUTION INTRAMUSCULAR; INTRAVENOUS at 08:53

## 2018-04-05 RX ADMIN — LEVALBUTEROL INHALATION 0.63MG/3ML SCH MG: 0.63 SOLUTION RESPIRATORY (INHALATION) at 20:56

## 2018-04-05 RX ADMIN — CARBIDOPA AND LEVODOPA SCH TAB: 25; 100 TABLET ORAL at 16:16

## 2018-04-05 RX ADMIN — CARBIDOPA AND LEVODOPA SCH TAB: 25; 100 TABLET ORAL at 08:52

## 2018-04-05 RX ADMIN — LEVALBUTEROL INHALATION 0.63MG/3ML SCH MG: 0.63 SOLUTION RESPIRATORY (INHALATION) at 05:41

## 2018-04-05 RX ADMIN — LEVALBUTEROL INHALATION 0.63MG/3ML SCH MG: 0.63 SOLUTION RESPIRATORY (INHALATION) at 10:36

## 2018-04-05 NOTE — ASMTCMCOM
CM Note

 

CM Note                       

Notes:

Ada from [facility] ([phone]) ( main # [phone]) (Fax [phone]) here to see pt 

today, discussed dc home in a few days, probably this weekend. CM let Ada know that therapies 

would like to add Speech therapy to , she agrees with plan.



DC Plan: Home care/ [facility] (RN/PT/OT/SLP) + private caregiver

 

Date Signed:  04/05/2018 03:34 PM

Electronically Signed By:Nery Blanca RN

## 2018-04-05 NOTE — SOAPPROG
SOAP Progress Note


Assessment/Plan: 


Assessment:


82 Y F c Parkinson's s/p exlap for SBO with resection of about 16 inches of 

infarcted bowel 4/2


Probable aspiration PNA likely during induction.  Pulmonology and IM following.





S: Sitting up in chair and smiling.  Passing flatus.  Ready to start eating a 

regular diet.  Caregiver at bedside.  Pt and caregiver wondering about timing 

of discharge





O: Alert, NAD


Afebrile


Cardiac: RRR


Chest: basilar crackle


Abdomen: soft, incision cdi, slightly ttp, +BS





Plan: Advance to regular diet.  Pt lives in assisted living facility with many 

caregivers who can take care of her.  Likely discharge back to assisted living 

in the next few days, pending pna status.  





04/05/18 10:59





Objective: 





 Vital Signs











Temp Pulse Resp BP Pulse Ox


 


 36.8 C   77   12   146/78 H  93 


 


 04/05/18 08:00  04/05/18 10:45  04/05/18 10:45  04/05/18 08:00  04/05/18 10:45








 Laboratory Results





 04/05/18 05:20 





 04/05/18 05:20 





 











 04/04/18 04/05/18 04/06/18





 05:59 05:59 05:59


 


Intake Total 619 2908 


 


Output Total 1150 2400 550


 


Balance -531 508 -550








 











PT  15.3 SEC (12.0-15.0)  H  04/02/18  10:10    


 


INR  1.19  (0.83-1.16)  H  04/02/18  10:10    














ICD10 Worksheet


Patient Problems: 


 Problems











Problem Status Onset


 


Bowel obstruction Acute  


 


Dehydration Acute  


 


Dizziness Acute  


 


Nausea Acute  


 


Parkinsons disease Acute

## 2018-04-05 NOTE — SOAPPROG
SOAP Progress Note


Assessment/Plan: 


Assessment:





Status post small-bowel obstruction with ischemia.  Adhesion lysed, ischemic 

bowel resected.  Doing well postoperatively.  Bowel sounds better, taking p.o.s

, passing gas, stool.





Aspiration pneumonia.  Improving clinically.  Chest x-ray lagging behind.  On 

room air to 1 L.  No significant cough or mucus.  No respiratory distress at 

this point.  Bibasilar rales remain present.  Some of these may be somewhat 

chronic?  On Invanz, bronchodilator therapies.





History of Parkinson's.  Stable.  Passed bedside swallow evaluation.





Anemia.  Secondary to surgery, rehydration, etc.  Hematocrit 25, stable.  No 

evidence of active bleeding.  Follow.





Metabolic:  No issues identified currently.








Plan:  Continue care including antibiotics and bronchopulmonary treatments.  

Increase mobilization as tolerated.  Advance diet as tolerated.  Follow 

laboratory.





20 min CC time spent with patient. Discussed with Surgery, nursing, the ICU M-D 

Team.





Subjective: 





Doing okay.  Remains on low-flow oxygen or room air.  Denies cough or chest 

pain.  Not bringing up any mucus.


Objective: 





 Vital Signs











Temp Pulse Resp BP Pulse Ox


 


 37.2 C   90   18   123/70 H  92 


 


 04/05/18 16:00  04/05/18 16:00  04/05/18 16:00  04/05/18 16:00  04/05/18 16:00








 Laboratory Results





 04/05/18 05:20 





 04/05/18 05:20 





 











 04/04/18 04/05/18 04/06/18





 05:59 05:59 05:59


 


Intake Total 619 2908 650


 


Output Total 1150 2400 1550


 


Balance -531 508 -900








 











PT  15.3 SEC (12.0-15.0)  H  04/02/18  10:10    


 


INR  1.19  (0.83-1.16)  H  04/02/18  10:10    














Physical Exam





- Physical Exam


General Appearance: alert, no apparent distress


EENT: other (Nasal cannula at 1 L when she is using it)


Neck: normal inspection


Respiratory: decreased breath sounds, rales (Rales at bases, no consolidation)


Cardiac/Chest: regular rate, rhythm, systolic murmur


Abdomen: normal bowel sounds, non-tender, soft


Skin: warm/dry, pallor


Extremities: pedal edema


Neuro/Psych: no motor/sensory deficits (Moves all extremities, tremor), 

cognition abnormalities (Some dementia)





ICD10 Worksheet


Patient Problems: 


 Problems











Problem Status Onset


 


Bowel obstruction Acute  


 


Dehydration Acute  


 


Dizziness Acute  


 


Nausea Acute  


 


Parkinsons disease Acute

## 2018-04-05 NOTE — HOSPPROG
Hospitalist Progress Note


Assessment/Plan: 





*  SBO s/p resection large segment infarcted small bowel


   -diet advanced


*  Aspiration PNA


   -IV Invanz - okay to change PO augmentin


*  Parkinson's 


   -Sinemet


*  ABL anemia


   -follow




















Subjective: Tolerating PO, off O2, only complaint is weakness


Objective: 


 Vital Signs











Temp Pulse Resp BP Pulse Ox


 


 37.2 C   90   18   123/70 H  92 


 


 04/05/18 16:00  04/05/18 16:00  04/05/18 16:00  04/05/18 16:00  04/05/18 16:00








 Laboratory Results





 04/05/18 05:20 





 04/05/18 05:20 





 











 04/04/18 04/05/18 04/06/18





 05:59 05:59 05:59


 


Intake Total 619 2908 


 


Output Total 1150 2400 1300


 


Balance -531 508 -1300








 











PT  15.3 SEC (12.0-15.0)  H  04/02/18  10:10    


 


INR  1.19  (0.83-1.16)  H  04/02/18  10:10    














- Physical Exam


Constitutional: no apparent distress, appears nourished, not in pain


Cardiovascular: regular rate and rhythym, no murmur, rub, or gallop


Respiratory: no respiratory distress, no rales or rhonchi, clear to auscultation


Gastrointestinal: normoactive bowel sounds, soft, non-tender abdomen, no 

palpable masses


Skin: no rashes or abrasions, no fluctuance, no induration


Neurologic: AAOx3, sensation intact bilaterally


Psychiatric: interacting appropriately, not anxious, not encephalopathic, 

thought process linear





ICD10 Worksheet


Patient Problems: 


 Problems











Problem Status Onset


 


Bowel obstruction Acute  


 


Dehydration Acute  


 


Dizziness Acute  


 


Nausea Acute  


 


Parkinsons disease Acute

## 2018-04-06 LAB — PLATELET # BLD: 175 10^3/UL (ref 150–400)

## 2018-04-06 RX ADMIN — LEVALBUTEROL INHALATION 0.63MG/3ML SCH MG: 0.63 SOLUTION RESPIRATORY (INHALATION) at 04:18

## 2018-04-06 RX ADMIN — FLUOROMETHOLONE PRN DROP: 1 SOLUTION/ DROPS OPHTHALMIC at 08:28

## 2018-04-06 RX ADMIN — LEVALBUTEROL INHALATION 0.63MG/3ML SCH MG: 0.63 SOLUTION RESPIRATORY (INHALATION) at 12:01

## 2018-04-06 RX ADMIN — CARBIDOPA AND LEVODOPA SCH TAB: 25; 100 TABLET ORAL at 08:27

## 2018-04-06 RX ADMIN — LEVALBUTEROL INHALATION 0.63MG/3ML SCH MG: 0.63 SOLUTION RESPIRATORY (INHALATION) at 21:16

## 2018-04-06 RX ADMIN — ENOXAPARIN SODIUM SCH MG: 100 INJECTION SUBCUTANEOUS at 08:37

## 2018-04-06 RX ADMIN — FLUTICASONE PROPIONATE SCH SPRAY: 50 SPRAY, METERED NASAL at 08:29

## 2018-04-06 RX ADMIN — CARBOXYMETHYLCELLULOSE SODIUM PRN DROP: 5 SOLUTION/ DROPS OPHTHALMIC at 16:46

## 2018-04-06 RX ADMIN — LEVALBUTEROL INHALATION 0.63MG/3ML SCH MG: 0.63 SOLUTION RESPIRATORY (INHALATION) at 16:18

## 2018-04-06 RX ADMIN — AMOXICILLIN AND CLAVULANATE POTASSIUM SCH MG: 875; 125 TABLET, FILM COATED ORAL at 20:30

## 2018-04-06 RX ADMIN — CARBIDOPA AND LEVODOPA SCH TAB: 25; 100 TABLET ORAL at 13:21

## 2018-04-06 RX ADMIN — CARBIDOPA AND LEVODOPA SCH TAB: 25; 100 TABLET ORAL at 16:46

## 2018-04-06 RX ADMIN — AMOXICILLIN AND CLAVULANATE POTASSIUM SCH MG: 875; 125 TABLET, FILM COATED ORAL at 08:27

## 2018-04-06 NOTE — SOAPPROG
SOAP Progress Note


Assessment/Plan: 


Assessment:


82 Y F c Parkinson's s/p exlap for SBO with resection of about 16 inches of 

infarcted bowel 4/2


Probable aspiration PNA likely during induction.  Pulmonology and IM following.





S: Sitting up in chair and smiling.  Passing flatus.  Ready to start eating a 

regular diet.  Caregiver at bedside.  Pt and caregiver wondering about timing 

of discharge





O: Alert, NAD


Afebrile


Cardiac: RRR


Chest: basilar crackle


Abdomen: soft, incision cdi, slightly ttp, +BS





Plan: Advance to regular diet.  Pt lives in assisted living facility with many 

caregivers who can take care of her.  Likely discharge back to assisted living 

in the next few days, pending pna status.  





04/05/18 10:59





04/06/18 10:16


Continues to do well.  Tolerated a regular diet yesterday.  Afebrile.  Pain 

well controlled.  Passing flatus and soft BMs.  Biggest complaint is urinary 

incontinence, which was present before surgery.  She is ok to go from our 

standpoint.  Dispo either back to her assisted living facility or SNF.  Care 

giver believes she would have plenty of care at assisted living facility.  Case 

management consulted.  


Objective: 





 Vital Signs











Temp Pulse Resp BP Pulse Ox


 


 37.2 C   77   16   121/65 H  93 


 


 04/06/18 08:00  04/06/18 08:00  04/06/18 08:00  04/06/18 08:00  04/06/18 08:00








 Laboratory Results





 04/06/18 05:07 





 04/05/18 05:20 





 











 04/05/18 04/06/18 04/07/18





 05:59 05:59 05:59


 


Intake Total 2908 800 


 


Output Total 2400 1550 


 


Balance 508 -750 








 











PT  15.3 SEC (12.0-15.0)  H  04/02/18  10:10    


 


INR  1.19  (0.83-1.16)  H  04/02/18  10:10    














ICD10 Worksheet


Patient Problems: 


 Problems











Problem Status Onset


 


Bowel obstruction Acute  


 


Dehydration Acute  


 


Dizziness Acute  


 


Nausea Acute  


 


Parkinsons disease Acute

## 2018-04-06 NOTE — SOAPPROG
SOAP Progress Note


Assessment/Plan: 


Assessment:





Status post small-bowel obstruction with ischemia.  Adhesion lysed, ischemic 

bowel resected.  Doing well postoperatively.  Bowel sounds better, taking p.o.s

, passing gas, stool.





Aspiration pneumonia.  Improving clinically.  Chest x-ray lagging behind.  On 

room air - 1 L.  No significant cough or mucus.  No respiratory distress at 

this point.  Bibasilar rales remain present.  Some of these may be somewhat 

chronic?  Off Invanz.





History of Parkinson's.  Stable.  Passed bedside swallow evaluation.





Anemia.  Secondary to surgery, rehydration, etc.  Hematocrit 27, stable.  No 

evidence of active bleeding.  Follow.





Metabolic:  No issues identified currently.








Plan:  Continue care, bronchopulmonary therapies as needed.  Increase 

mobilization as tolerated.  Advance diet as tolerated.  Follow laboratory.  May 

need low flow oxygen on discharge, at least while sleeping and at night.

















Subjective: 





Resting comfortably in bed, on 1 L.


Objective: 





 Vital Signs











Temp Pulse Resp BP Pulse Ox


 


 37.2 C   73   14   121/65 H  93 


 


 04/06/18 08:00  04/06/18 12:02  04/06/18 12:02  04/06/18 08:00  04/06/18 12:02








 Laboratory Results





 04/06/18 05:07 





 04/05/18 05:20 





 











 04/05/18 04/06/18 04/07/18





 05:59 05:59 05:59


 


Intake Total 2908 800 


 


Output Total 2400 1550 


 


Balance 508 -750 








 











PT  15.3 SEC (12.0-15.0)  H  04/02/18  10:10    


 


INR  1.19  (0.83-1.16)  H  04/02/18  10:10    














Physical Exam





- Physical Exam


General Appearance: other (Sleeping, arouses)


EENT: other (Nasal cannula in place at 1 L)


Neck: normal inspection (No JVD)


Respiratory: lungs clear (Anteriorly), decreased breath sounds (At bases), 

rales (Rales at bases bilateral)


Cardiac/Chest: regular rate, rhythm, systolic murmur, No gallop


Abdomen: normal bowel sounds, non-tender, soft


Skin: warm/dry, pallor


Extremities: No pedal edema


Neuro/Psych: no motor/sensory deficits (Moves all extremities equally, globally 

weak, Parkinson's), cognition abnormalities (Some dementia)





ICD10 Worksheet


Patient Problems: 


 Problems











Problem Status Onset


 


Bowel obstruction Acute  


 


Dehydration Acute  


 


Dizziness Acute  


 


Nausea Acute  


 


Parkinsons disease Acute

## 2018-04-06 NOTE — HOSPPROG
Hospitalist Progress Note


Assessment/Plan: 





*  SBO s/p resection large segment infarcted small bowel


   -diet advanced - tolerating


*  Aspiration PNA -event related to anesthesia induction


   -PO augmentin


*  Parkinson's 


   -Sinemet


*  ABL anemia


   -follow











Dispo - now agreeable to SNF.    Likely tomorrow am.








Subjective: Tolerating PO, positive BM


Objective: 


 Vital Signs











Temp Pulse Resp BP Pulse Ox


 


 37.2 C   73   14   121/65 H  93 


 


 04/06/18 08:00  04/06/18 12:02  04/06/18 12:02  04/06/18 08:00  04/06/18 12:02








 Laboratory Results





 04/06/18 05:07 





 04/05/18 05:20 





 











 04/05/18 04/06/18 04/07/18





 05:59 05:59 05:59


 


Intake Total 2908 800 


 


Output Total 2400 1550 


 


Balance 508 -750 








 











PT  15.3 SEC (12.0-15.0)  H  04/02/18  10:10    


 


INR  1.19  (0.83-1.16)  H  04/02/18  10:10    














- Physical Exam


Constitutional: no apparent distress, appears nourished, not in pain


Cardiovascular: regular rate and rhythym, no murmur, rub, or gallop


Respiratory: no respiratory distress, no rales or rhonchi, clear to auscultation


Gastrointestinal: normoactive bowel sounds, soft, non-tender abdomen, no 

palpable masses


Skin: no rashes or abrasions, no fluctuance, no induration


Neurologic: AAOx3, sensation intact bilaterally


Psychiatric: interacting appropriately, not anxious, not encephalopathic, 

thought process linear





ICD10 Worksheet


Patient Problems: 


 Problems











Problem Status Onset


 


Bowel obstruction Acute  


 


Dehydration Acute  


 


Dizziness Acute  


 


Nausea Acute  


 


Parkinsons disease Acute

## 2018-04-06 NOTE — ASMTCMCOM
CM Note

 

CM Note                       

Notes:

Spoke w/MD, pt now agreeable to SNF. CM met w/pt, caregiver Merlene ([phone]), and lakisha Elaine 

([phone]). Pt understands she still is too weak to return to [facility]. They would like a 

referral sent to [facility], CM also called the new snf in [facility] but they are 

not officially open yet, and  has no beds.



CM spoke with [facility] who has accepted pt but initially pt will be in the longterm care unit and 

will have a roomate until a room opens on the rehab floor, which will likely be Wednesday. CM 

discussed with pt and she is ok with that.



Caregiver Merlene will come to hospital at 1pm to follow pt to snf.



DC Plan: SNF/ [facility]

 

Date Signed:  04/06/2018 03:37 PM

Electronically Signed By:Nery Blanca RN

## 2018-04-07 VITALS — DIASTOLIC BLOOD PRESSURE: 59 MMHG | SYSTOLIC BLOOD PRESSURE: 105 MMHG

## 2018-04-07 RX ADMIN — LEVALBUTEROL INHALATION 0.63MG/3ML SCH MG: 0.63 SOLUTION RESPIRATORY (INHALATION) at 05:25

## 2018-04-07 RX ADMIN — CARBIDOPA AND LEVODOPA SCH: 25; 100 TABLET ORAL at 14:15

## 2018-04-07 RX ADMIN — FLUTICASONE PROPIONATE SCH SPRAY: 50 SPRAY, METERED NASAL at 10:02

## 2018-04-07 RX ADMIN — LEVALBUTEROL INHALATION 0.63MG/3ML SCH MG: 0.63 SOLUTION RESPIRATORY (INHALATION) at 11:10

## 2018-04-07 RX ADMIN — CARBIDOPA AND LEVODOPA SCH TAB: 25; 100 TABLET ORAL at 08:49

## 2018-04-07 RX ADMIN — ENOXAPARIN SODIUM SCH MG: 100 INJECTION SUBCUTANEOUS at 08:49

## 2018-04-07 RX ADMIN — AMOXICILLIN AND CLAVULANATE POTASSIUM SCH MG: 875; 125 TABLET, FILM COATED ORAL at 08:49

## 2018-04-07 RX ADMIN — CARBOXYMETHYLCELLULOSE SODIUM PRN DROP: 5 SOLUTION/ DROPS OPHTHALMIC at 10:29

## 2018-04-07 RX ADMIN — FLUOROMETHOLONE PRN DROP: 1 SOLUTION/ DROPS OPHTHALMIC at 10:01

## 2018-04-07 NOTE — PDIAF
- Diagnosis


Diagnosis: bowel obstruction


Code Status: Do Not Resuscitate





- Medication Management


Discharge Medications: 


 Medications to Continue on Transfer





Albuterol Sulfate [Proair Hfa] 1 puffs IH QID PRN 01/06/18 [Last Taken Unknown]


Escitalopram Oxalate [Lexapro] 10 mg PO HS 01/06/18 [Last Taken 04/01/18]


Fluticasone Nasal [Flonase Nasal Spray] 1 sprays NASAL DAILY 01/06/18 [Last 

Taken 04/02/18]


Mirabegron [Myrbetriq] 25 mg PO DAILY 01/06/18 [Last Taken 04/02/18]


Rivastigmine [Exelon 4.6mg/24 hours] 1 each TD DAILY 01/06/18 [Last Taken 04/02/ 18]


Carbidopa/Levodopa 25/100Mg [Sinemet 25/100 MG (*)] 1 tab PO TID@08,12,16 04/02/ 18 [Last Taken 04/02/18 12:00]


Carboxymethylcellulose 0.5% [Refresh Plus Drops 0.5%] 1 drops EACHEYE Q6H PRN 04 /04/18 [Last Taken Unknown]


Fluorometholone [Fml (*)] 1 drop EACHEYE Q12H PRN 04/04/18 [Last Taken Unknown]


Amoxicillin/Clavulanate Pot [Augmentin 875 MG TAB (*)] 875 mg PO BID #10 tab 04/ 07/18 [Last Taken Unknown]








Long Term Antibiotic Stop Date: 04/11/18


Discharge Medications: Refer to the Discharge Home Medication list for PRN 

reason.





- Orders


Services needed: Physical Therapy, Occupational Therapy, Speech Language 

Pathologist


Diet Recommendation: no restrictions on diet


Diet Texture: Thin Liquids, Meds Whole w/Liquids


Additional: Avoid heavy lifting.  May shower as desired, but no soaking in tub 

or pool.





- Follow Up Care


Current Providers and Referrals: 


Frankel,Zara, MD [Primary Care Provider] - As per Instructions


Jimenez Perez MD [Medical Doctor] - follow up in 1 week

## 2018-04-07 NOTE — ASDISCHSUM
----------------------------------------------

Discharge Information

----------------------------------------------

Plan Status:SNF                                      Medically Cleared to Leave:

Discharge Date:04/07/2018 01:21 PM                    D/C Disposition:Skilled Nursing Facility

ADT D/C Disposition:Skilled Nursing Facility         Projected Discharge Date:04/07/2018 11:00 AM

Transportation at D/C:Wheelchair Van                 Discharge Delay Reason:

Follow-Up Date:04/07/2018 11:00 AM                   Discharge Slot:

Final Diagnosis:Small bowel OBS

----------------------------------------------

Placement Information

----------------------------------------------

Referral Type:*Home Health Care Services             Referral ID:[id number]

Provider Name:

Address 1:                                           Phone Number:

Address 2:                                           Fax Number:

City:                                                Selection Factors:

State:

 

Referral Type:*Nursing Home/SNF                      Referral ID:[id number]

Provider Name:[facility]

Address 1:[address] [place] [address]                              Phone [phone]

Address 2:                                           Fax [phone]

City:[place]                                        Selection Factors:

State:[place]

 

----------------------------------------------

Patient Contact Information

----------------------------------------------

Contact Name:ARTEMIO                              Relationship:Other

Address:                                             Home [phone]

                                                     Work Phone:

City:                                                [facility] Phone:

State/Zip Code:                                      Email:

----------------------------------------------

Financial Information

----------------------------------------------

Financial Class:Medicare

Primary Plan Desc:MEDICARE INPATIENT                 Primary Plan Number:[id number]

Secondary Plan Desc:[facility]                             Secondary Plan Number:[id number]

 

 

----------------------------------------------

Assessment Information

----------------------------------------------

----------------------------------------------

[facility] CM Progress Note

----------------------------------------------

CM Note

 

CM Note                       

Notes:

82yr old female admitted for abdominal pain, N/V, Dehydration, Emesis, Small bowel obs. Patient 

also has a wound on her coccyx. She has a Hx of Nephritis. Patient lives at [facility] and has 

a friend Argentina who is her MPOA. Therapies to [facility] for discharge needs. CM to follow.

 

Date Signed:  04/03/2018 03:18 PM

Electronically Signed By:Fidelia Palacios LCSW

 

 

----------------------------------------------

[facility] CM Progress Note

----------------------------------------------

CM Note

 

CM Note                       

Notes:

I spoke with patient's (private pay) caregiver Merlene. She is with patient for 3hrs/day, 5 

days/week. Patient also gets help in AM and PM from [facility] staff. Per Merlene, she can scale 

up her assistance from [facility]. I called her JAI Elaine to discuss further; I left a voice mail.



I sent a referral to [facility]. They can provide skilled care upon discharge. Per 

Merlene, this patient would do better at home vs SNF, but we will continue to assess daily. 

 

Date Signed:  04/04/2018 02:38 PM

Electronically Signed By:Merlyn Linares RN

 

 

----------------------------------------------

[facility] Progress Note

----------------------------------------------

CM Note

 

CM Note                       

Notes:

Spoke with patient's MDPRAO Elaine who also advocates for patient to go back to [facility]. She 

agrees with patient's caregiver Merlene that they can utilize [facility] services (adding care at 

night), as well as skilled care. I have sent a referral to [facility], and they 

anticipate accepting patient. Case Management will follow. 

 

Date Signed:  04/04/2018 03:30 PM

Electronically Signed By:Merlyn Linares RN

 

 

----------------------------------------------

[facility] CM Progress Note

----------------------------------------------

CM Note

 

CM Note                       

Notes:

Ada from [facility] ([phone]) ( main # [phone]) (Fax [phone]) here to see pt 

today, discussed dc home in a few days, probably this weekend. ELAIAN let Ada know that therapies 

would like to add Speech therapy to , she agrees with plan.



DC Plan: Home care/ [facility] (RN/PT/OT/SLP) + private caregiver

 

Date Signed:  04/05/2018 03:34 PM

Electronically Signed By:Nery Blanca RN

 

 

----------------------------------------------

[facility] ELAINA Progress Note

----------------------------------------------

CM Note

 

ELAINA Note                       

Notes:

Spoke w/MD, pt now agreeable to SNF. ELAINA met w/pt, caregiver Merlene ([phone]), and jai Elaine 

([phone]). Pt understands she still is too weak to return to [facility]. They would like a 

referral sent to [facility] [place], ELAINA also called the new snf in [facility] but they are 

not officially open yet, and  has no beds.



ELAINA spoke with [facility] who has accepted pt but initially pt will be in the longterm care unit and 

will have a roomate until a room opens on the rehab floor, which will likely be Wednesday. ELAINA 

discussed with pt and she is ok with that.



Caregiver Merlene will come to hospital at 1pm to follow pt to snf.



DC Plan: SNF/ [facility]

 

Date Signed:  04/06/2018 03:37 PM

Electronically Signed By:Nery Blanca RN

 

 

----------------------------------------------

Case Management Discharge Plan Note

----------------------------------------------

Case Management Discharge

 

Discharge Order Complete?     Answers:  Yes                                   

Patient to Obtain             Answers:  Other                         Notes:  [facility]

Medications                                                                   

Transportation Arranged       Answers:  Other                         Notes:  [facility] set up [facility] 

                                                                              wheelchair

EMTALA Complete               Answers:  No                                    

Case Management Transport     Answers:  No                                    

Form Complete                                                                 

Faxed Final Orders            Answers:  Yes                                   

Agency/Facility Transfer      Answers:  Yes                                   

Report Printed & Faxed to                                                     

Receiving Agency                                                              

Family Notified               Answers:  Yes                           Notes:  MDPOA and caregiver

Discharge Comments            

Notes:

Pt. d/cing today to [facility].  Oscar worked with Marni at [facility].  Sent over d/c 


paperwork in [employer].  Marni arranged for [facility] wheelchair transport to arrive at 

13:00.   Oscar notified caregiver Ema who will be here for d/c.  Oscar left  msg with JAI Elaine.  Coordinated w/ bedside RN.  

 

Date Signed:  04/07/2018 12:13 PM

Electronically Signed By:Daylin Waite LCSW

 

 

----------------------------------------------

Intervention Information

----------------------------------------------

Intervention Type:*Incorrect Registration            Date of Service:04/02/2018 02:47 PM

Patient Type:Observation                             Staff Member:ECHO Iyer Courtney

Hours:                                               Discipline:

Severity:                                            Comment:

Intervention Type:*IM-Signed                         Date of Service:04/06/2018 03:25 PM

Patient Type:Inpatient                               Staff Member:ECHO Fernandez, Tara

Hours:                                               Discipline:

Severity:                                            Comment:

## 2018-04-07 NOTE — GDS
[f rep st]



                                                             DISCHARGE SUMMARY





DISCHARGE DIAGNOSES:  

1.  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

2.  Aspiration pneumonia with aspiration event at the time of initiating anesthesia.

3.  Parkinson.

4.  Acute blood loss anemia.



HISTORY:  The patient is an 82-year-old female, who suffers from Parkinson's disease.  She presented 
with a small bowel obstruction.  She did require surgery, including a small bowel resection.  She rec
overed quite nicely postsurgically and advanced her diet without any difficulty.  She did have an asp
iration event, which was related to her intubation for surgery.  She received IV Invanz and has now b
een transitioned onto oral Augmentin and is improving from a pneumonia standpoint.  She can complete 
a 10-day course of Augmentin post discharge. 



She is in a weakened state and is going to need rehabilitation.  She is transferring to a skilled aleksandar
sing facility prior to returning back to her assisted living.



DISCHARGE MEDICATIONS:  Please see computer record for full detailed list.  



New medications:  Augmentin 875 mg p.o. b.i.d. for 5 more days.



ADDITIONAL DISCHARGE INSTRUCTIONS:  

1.  [facility] facility.  

2.  Surgical instructions include avoid heavy lifting, may shower as desired but no soaking in a tub 
or pool and follow up with Dr. Perez in 1 week. 



Greater than 30 minutes' time was spent arranging this discharge.  Patient seen and examined by me on
 day of discharge.





Job #:  [id number]

## 2018-04-09 NOTE — GOP
[f 
rep st]



                                                                OPERATIVE REPORT





DATE OF OPERATION:  04/02/2018



SURGEON:  Jimenez Perez MD



ASSISTANT:  GEMINI Lopez



ANESTHESIOLOGIST:  Dr. Solitario.  GET.   



The wound was also infiltrated with 0.5% Marcaine.



PREOPERATIVE DIAGNOSIS:  Small bowel obstruction with possible ischemia.



POSTOPERATIVE DIAGNOSIS:  Small bowel obstruction with bowel infarction.



PROCEDURE PERFORMED:  Laparotomy with lysis of adhesions and small-bowel 
resection.



FINDINGS:  The patient was found to have single small adhesive band with about 
20 inches of infarcted small bowel which required resection.





DESCRIPTION OF PROCEDURE:  The patient was taken to the operating room where 
she received satisfactory general endotracheal anesthesia by Dr. Solitario, placed 
in supine position, prepped and draped in usual sterile fashion.  A midline 
abdominal incision was made and carried through the linea alba.  The abdomen 
was carefully entered.  Small bowel was eviscerated with the findings of some 
dark bowel.  A single adhesive band was identified and this was lysed, freeing 
up the entire small bowel.  This was delivered out into the abdominal wall, but 
the area of black bowel did not respond to time or warming up, and it was 
elected to resect that section.  This was done by dividing the mesentery with 
the Harmonic scalpel and/or 2-0 Vicryl ties.  The bowel was divided at either 
end with a PRASHANTH stapler at a point where there was good bleeding and 
vascularity.  A side-to-side anastomosis was made with insertion of the PRASHANTH 
stapler and a cross application of the stapler to close the insertion site.  
The suture line reinforced with interrupted silk sutures and the mesentery 
closed with a running 3-0 Vicryl suture.  The wound was irrigated.  Hemostasis 
was assured.  No other abnormalities were encountered.  The abdomen was then 
closed in layers using a running #1 PDS suture for the linea alba, 3-0 Vicryl 
for the subcu, and skin staples for the skin.  She tolerated procedure well.  
She was taken to the recovery room in good condition.





Job #:  [id number]

MTDD

## 2019-02-10 ENCOUNTER — HOSPITAL ENCOUNTER (INPATIENT)
Dept: HOSPITAL 80 - FED | Age: 84
LOS: 2 days | Discharge: HOME HEALTH | DRG: 690 | End: 2019-02-12
Attending: INTERNAL MEDICINE | Admitting: INTERNAL MEDICINE
Payer: COMMERCIAL

## 2019-02-10 DIAGNOSIS — R53.1: ICD-10-CM

## 2019-02-10 DIAGNOSIS — F03.90: ICD-10-CM

## 2019-02-10 DIAGNOSIS — E86.0: ICD-10-CM

## 2019-02-10 DIAGNOSIS — E87.1: ICD-10-CM

## 2019-02-10 DIAGNOSIS — N39.0: Primary | ICD-10-CM

## 2019-02-10 DIAGNOSIS — N32.81: ICD-10-CM

## 2019-02-10 LAB — PLATELET # BLD: 232 10^3/UL (ref 150–400)

## 2019-02-10 PROCEDURE — G0378 HOSPITAL OBSERVATION PER HR: HCPCS

## 2019-02-10 RX ADMIN — CARBIDOPA AND LEVODOPA SCH TAB: 25; 100 TABLET ORAL at 15:10

## 2019-02-10 RX ADMIN — SODIUM CHLORIDE SCH MLS: 900 INJECTION, SOLUTION INTRAVENOUS at 13:23

## 2019-02-10 RX ADMIN — SODIUM CHLORIDE SCH MLS: 900 INJECTION, SOLUTION INTRAVENOUS at 21:25

## 2019-02-10 RX ADMIN — OXYBUTYNIN CHLORIDE SCH MG: 5 TABLET, FILM COATED, EXTENDED RELEASE ORAL at 20:07

## 2019-02-10 NOTE — CPEKG
Test Reason : OPEN

Blood Pressure : ***/*** mmHG

Vent. Rate : 080 BPM     Atrial Rate : 079 BPM

   P-R Int : 171 ms          QRS Dur : 083 ms

    QT Int : 389 ms       P-R-T Axes : 049 005 -01 degrees

   QTc Int : 449 ms

 

Sinus rhythm

Borderline T wave abnormalities

 

Confirmed by Juan Ramon Mayo ([id number]) on 2/10/2019 12:00:05 PM

 

Referred By: JUAN RAMON MAYO           Confirmed By:Juan Ramon Mayo

## 2019-02-10 NOTE — EDPHY
H & P


Time Seen by Provider: 02/10/19 10:47


HPI/ROS: 





CHIEF COMPLAINT:  Weakness





HISTORY OF PRESENT ILLNESS:  Patient has been noted to have some halting speech 

for about a week.  Today she was so weak that she could not stand up.  Normally 

she can help transfer and walk with a walker but today she was so weak that she 

could not even stand by herself.  Required great assistance and even then she 

was unsteady on her feet.


Not associated with arm weakness or anything focal, no visual symptoms or 

headache.  Symptoms severe and not better worse with anything.





REVIEW OF SYSTEMS:


Eye: no change in vision


ENT: no sore throat


Cardiac: no chest pain or syncope


Pulmonary: no cough or SOB


Abdomen: no vomiting, diarrhea, abdominal pain


Musculoskeletal:  No neck pain


Skin: no rash


Neuro: no headache


Constitutional: no fever


: no urinary symptoms





A comprehensive 10 point review of systems is otherwise negative aside from 

elements mentioned in the history of present illness.





PAST MEDICAL HISTORY:  Includes Parkinson's, tonsillectomy appendectomy, 

rheumatic fever





Social history:  Here with , never smoked





General Appearance: Alert and conversant, cooperative.


Eyes: No scleral  icterus. 


ENT, Mouth:  Slightly dry mucous membranes.


Respiratory: Normal respiratory effort, breath sounds equal, lungs are clear to 

auscultation.


Cardiovascular:  Regular rate and rhythm.


Gastrointestinal:  Abdomen is soft and non tender.


Neurological:  Alert, face symmetric.  Speech is slightly halting.  Follows 

commands appropriately.  Can lift each leg off the bed independently.  Patellar 

reflexes 2+ and symmetric and toes downgoing and no clonus.  Good  strength 

bilaterally.


Skin: Warm and dry, no rashes.


Musculoskeletal: No peripheral edema.


Psychiatric: Not agitated.





Emergency Department course/MDM:





Head CT, CBC chemistry, EKG and urine.  Admission for further evaluation, can't 

go home as she is too weak to be safe.  Supportive care and further evaluation.





1201: CT negative except for microvascular disease per Nusser.


Smoking Status: Never smoked


Constitutional: 


 Initial Vital Signs











Temperature (C)  37 C   02/10/19 10:27


 


Heart Rate  84   02/10/19 10:27


 


Respiratory Rate  16   02/10/19 10:27


 


Blood Pressure  126/79 H  02/10/19 10:27


 


O2 Sat (%)  96   02/10/19 10:27








 











O2 Delivery Mode               Room Air














Allergies/Adverse Reactions: 


 





No Known Allergies Allergy (Verified 02/10/19 10:25)


 








Home Medications: 














 Medication  Instructions  Recorded


 


Escitalopram Oxalate [Lexapro] 10 mg PO HS 01/06/18


 


Mirabegron [Myrbetriq] 25 mg PO DAILY 01/06/18


 


Carbidopa/Levodopa 25/100Mg 1 tab PO TID@08,12,16 04/02/18





[Sinemet 25/100 MG (*)]  


 


Herbals/Supplements -Info Only 1 ea PO DAILY 02/10/19


 


Multivitamins [Multivitamin (*)] 1 each PO DAILY 02/10/19


 


Rivastigmine [Rivastigmine] 1 patch TP DAILY 02/10/19














Medical Decision Making





- Diagnostics


EKG Interpretation: 





12-lead EKG interpreted by me; official reading is in computer system.  My 

interpretation is sinus rhythm rate 80 with nonspecific T-wave abnormalities.


Imaging Results: 


 Imaging Impressions





Head CT  02/10/19 11:26


Impression: No evidence for acute intracranial abnormality. Extensive 

periventricular and deep hemispheric white matter change, which is nonspecific 

and can be seen with small vessel ischemic disease.


 


Results called and discussed with Naif Orosco MD on February 10, 2019 at 12:

02 p.m.











Imaging: Discussed imaging studies w/ On call Radiologist


Differential Diagnosis: 





Differential diagnosis considered for weakness including but not limited to 

electrolyte abnormality, depression, anxiety, CVA, spinal cord abnormality, and 

infectious causes.


Consult/Admit Bed Type: [facility]





- Data Points


Laboratory Results: 


 Laboratory Results





 02/10/19 11:55 





 02/10/19 11:55 





 











  02/10/19 02/10/19 02/10/19





  11:55 11:55 11:55


 


WBC      4.88 10^3/uL 10^3/uL





     (3.80-9.50) 


 


RBC      4.25 10^6/uL 10^6/uL





     (4.18-5.33) 


 


Hgb      13.0 g/dL g/dL





     (12.6-16.3) 


 


Hct      38.3 % %





     (38.0-47.0) 


 


MCV      90.1 fL fL





     (81.5-99.8) 


 


MCH      30.6 pg pg





     (27.9-34.1) 


 


MCHC      33.9 g/dL g/dL





     (32.4-36.7) 


 


RDW      14.2 % %





     (11.5-15.2) 


 


Plt Count      232 10^3/uL 10^3/uL





     (150-400) 


 


MPV      9.0 fL fL





     (8.7-11.7) 


 


Neut % (Auto)      Not Reported 





    


 


Lymph % (Auto)      Not Reported 





    


 


Mono % (Auto)      Not Reported 





    


 


Eos % (Auto)      Not Reported 





    


 


Baso % (Auto)      Not Reported 





    


 


Nucleat RBC Rel Count      Not Reported 





    


 


Absolute Neuts (auto)      Not Reported 





    


 


Absolute Lymphs (auto)      Not Reported 





    


 


Absolute Monos (auto)      Not Reported 





    


 


Absolute Eos (auto)      Not Reported 





    


 


Absolute Basos (auto)      Not Reported 





    


 


Absolute Nucleated RBC      Not Reported 





    


 


Immature Gran %      Not Reported 





    


 


Seg Neutrophils %      84.0 % %





    


 


Band Neutrophils %      0.0 % %





    


 


Lymphocytes %      12.0 % %





    


 


Monocytes %      3.0 % %





    


 


Eosinophils %      1.0 % %





    


 


Basophils %      0.0 % %





    


 


Metamyelocytes %      0.0 % %





    


 


Myelocytes %      0.0 % %





    


 


Promyelocytes %      0.0 % %





    


 


Blast Cells %      0.0 % %





    


 


Immature Gran #      Not Reported 





    


 


Absolute Seg Neuts      4.10 10^3/uL 10^3/uL





     (1.70-6.50) 


 


Absolute Band Neuts      0.00 10^3/uL 10^3/uL





     (0.00-0.70) 


 


Absolute Lymphocytes      0.59 10^3/uL L 10^3/uL





     (1.00-3.00) 


 


Absolute Monocytes      0.15 10^3/uL L 10^3/uL





     (0.30-0.80) 


 


Absolute Eosinophils      0.05 10^3/uL 10^3/uL





     (0.03-0.40) 


 


Absolute Basophils      0.00 10^3/uL L 10^3/uL





     (0.02-0.10) 


 


Absolute Metamyelocyte      0.00 10^3/mL 10^3/mL





     (0.00-0.00) 


 


Absolute Myelocytes      0.00 10^3/mL 10^3/mL





     (0.00-0.00) 


 


Absolute Promyelocytes      0.00 10^3/uL 10^3/uL





     (0.00-0.00) 


 


Absolute Plasma Cells      0.00 10^3/uL 10^3/uL





     (0.00-0.00) 


 


Absolute Blast Cells      0.00 10^3/uL 10^3/uL





     (0.00-0.00) 


 


Plasma Cells %      0.0 % %





    


 


Platelet Estimate      ADEQUATE 





     (ADEQ) 


 


Sodium    134 mEq/L L mEq/L  





    (135-145)  


 


Potassium    4.4 mEq/L mEq/L  





    (3.5-5.2)  


 


Chloride    100 mEq/L mEq/L  





    ()  


 


Carbon Dioxide    25 mEq/l mEq/l  





    (22-31)  


 


Anion Gap    9 mEq/L mEq/L  





    (6-14)  


 


BUN    25 mg/dL H mg/dL  





    (7-23)  


 


Creatinine    0.8 mg/dL mg/dL  





    (0.6-1.0)  


 


Estimated GFR    > 60   





    


 


Glucose    97 mg/dL mg/dL  





    ()  


 


Calcium    9.4 mg/dL mg/dL  





    (8.5-10.4)  


 


TSH  Pending     





    











Medications Given: 


 





Sodium Chloride (Ns)  1,000 mls @ 75 mls/hr IV CONT JACQUELINE


   Stop: 02/11/19 00:44


   Last Admin: 02/10/19 13:23 Dose:  1,000 mls





Discontinued Medications





Sodium Chloride (Ns)  1,000 mls @ 0 mls/hr IV EDNOW ONE; Wide [facility]


   PRN Reason: Protocol


   Stop: 02/10/19 11:28


   Last Admin: 02/10/19 12:02 Dose:  1,000 mls








Departure





- Departure


Disposition: [facility]


Clinical Impression: 


 Weakness





Condition: Good

## 2019-02-10 NOTE — PDGENHP
History and Physical





- Chief Complaint


Weakness





- History of Present Illness


Mecca Nye is a 84 yo F with a PMHx of Parkinson's disease, overactive bladder 

who presents to [facility] for weakness.  She reports that weakness was acute onset 

this morning where she could not stand on her own.  She usually is able to 

ambulate with a walker.  She denies any specific muscle weakness, numbness 

tingling, visual changes, headache, neck stiffness.  She reports some decreased 

urination recently with some burning with urination as well as a productive 

cough with yellow/green sputum.  She denies any f/c, n/v, d/c, edema, chest pain

, SOB, abdominal pain, LH/dizziness.  She also states that she has had some 

cramping in her thighs.  She denies any rhinorrhea, nasal congestion.





History Information





- Allergies/Home Medication List


Allergies/Adverse Reactions: 








No Known Allergies Allergy (Verified 02/10/19 10:25)


 





Home Medications: 








Escitalopram Oxalate [Lexapro] 10 mg PO HS 01/06/18 [Last Taken 04/01/18]


Mirabegron [Myrbetriq] 25 mg PO DAILY 01/06/18 [Last Taken 04/02/18]


Carbidopa/Levodopa 25/100Mg [Sinemet 25/100 MG (*)] 1 tab PO TID@08,12,16 04/02/ 18 [Last Taken 04/02/18 12:00]


Herbals/Supplements -Info Only 1 ea PO DAILY 02/10/19 [Last Taken Unknown]


Multivitamins [Multivitamin (*)] 1 each PO DAILY 02/10/19 [Last Taken Unknown]


Rivastigmine [Rivastigmine] 1 patch TP DAILY 02/10/19 [Last Taken Unknown]





I have personally reviewed and updated: family history, medical history, social 

history, surgical history





- Past Medical History


Additional medical history: Parkinson's Disease, childhood rheumatic fever, 

nephritis





- Surgical History


Additional surgical history: Tonsillectomy and adenoidectomy, appendectomy, BTL

, cosmetic surgery and now status post ex lap with small-bowel resection





- Family History


Positive for: cancer





- Social History


Smoking Status: Never smoked


Additional social history: Patient resides at [facility].  Cor 

status is DNR DNI with copy advanced directive in chart.





Review of Systems


Review of Systems: 





ROS: 10pt was reviewed & negative except for what was stated in HPI & below





Physical Exam


Physical Exam: 

















Temp Pulse Resp BP Pulse Ox


 


 37.4 C   80   16   124/76 H  97 


 


 02/10/19 13:17  02/10/19 13:18  02/10/19 13:18  02/10/19 13:18  02/10/19 13:18











Constitutional: no apparent distress


Eyes: PERRL


Ears, Nose, Mouth, Throat: dry mucous membranes


Cardiovascular: regular rate and rhythym


Respiratory: no respiratory distress, clear to auscultation


Gastrointestinal: soft, non-tender abdomen


Genitourinary: No montero in urethra


Skin: warm, normal color


Musculoskeletal: full muscle strength, abnormal gait, No pain with ROM


Neurologic: AAOx3


Psychiatric: interacting appropriately





Lab Data & Imaging Review





 02/10/19 11:55





 02/10/19 11:55














WBC  4.88 10^3/uL (3.80-9.50)   02/10/19  11:55    


 


RBC  4.25 10^6/uL (4.18-5.33)   02/10/19  11:55    


 


Hgb  13.0 g/dL (12.6-16.3)   02/10/19  11:55    


 


Hct  38.3 % (38.0-47.0)   02/10/19  11:55    


 


MCV  90.1 fL (81.5-99.8)   02/10/19  11:55    


 


MCH  30.6 pg (27.9-34.1)   02/10/19  11:55    


 


MCHC  33.9 g/dL (32.4-36.7)   02/10/19  11:55    


 


RDW  14.2 % (11.5-15.2)   02/10/19  11:55    


 


Plt Count  232 10^3/uL (150-400)   02/10/19  11:55    


 


MPV  9.0 fL (8.7-11.7)   02/10/19  11:55    


 


Neut % (Auto)  Not Reported   02/10/19  11:55    


 


Lymph % (Auto)  Not Reported   02/10/19  11:55    


 


Mono % (Auto)  Not Reported   02/10/19  11:55    


 


Eos % (Auto)  Not Reported   02/10/19  11:55    


 


Baso % (Auto)  Not Reported   02/10/19  11:55    


 


Nucleat RBC Rel Count  Not Reported   02/10/19  11:55    


 


Absolute Neuts (auto)  Not Reported   02/10/19  11:55    


 


Absolute Lymphs (auto)  Not Reported   02/10/19  11:55    


 


Absolute Monos (auto)  Not Reported   02/10/19  11:55    


 


Absolute Eos (auto)  Not Reported   02/10/19  11:55    


 


Absolute Basos (auto)  Not Reported   02/10/19  11:55    


 


Absolute Nucleated RBC  Not Reported   02/10/19  11:55    


 


Immature Gran %  Not Reported   02/10/19  11:55    


 


Seg Neutrophils %  84.0 %  02/10/19  11:55    


 


Band Neutrophils %  0.0 %  02/10/19  11:55    


 


Lymphocytes %  12.0 %  02/10/19  11:55    


 


Monocytes %  3.0 %  02/10/19  11:55    


 


Eosinophils %  1.0 %  02/10/19  11:55    


 


Basophils %  0.0 %  02/10/19  11:55    


 


Metamyelocytes %  0.0 %  02/10/19  11:55    


 


Myelocytes %  0.0 %  02/10/19  11:55    


 


Promyelocytes %  0.0 %  02/10/19  11:55    


 


Blast Cells %  0.0 %  02/10/19  11:55    


 


Immature Gran #  Not Reported   02/10/19  11:55    


 


Absolute Seg Neuts  4.10 10^3/uL (1.70-6.50)   02/10/19  11:55    


 


Absolute Band Neuts  0.00 10^3/uL (0.00-0.70)   02/10/19  11:55    


 


Absolute Lymphocytes  0.59 10^3/uL (1.00-3.00)  L  02/10/19  11:55    


 


Absolute Monocytes  0.15 10^3/uL (0.30-0.80)  L  02/10/19  11:55    


 


Absolute Eosinophils  0.05 10^3/uL (0.03-0.40)   02/10/19  11:55    


 


Absolute Basophils  0.00 10^3/uL (0.02-0.10)  L  02/10/19  11:55    


 


Absolute Metamyelocyte  0.00 10^3/mL (0.00-0.00)   02/10/19  11:55    


 


Absolute Myelocytes  0.00 10^3/mL (0.00-0.00)   02/10/19  11:55    


 


Absolute Promyelocytes  0.00 10^3/uL (0.00-0.00)   02/10/19  11:55    


 


Absolute Plasma Cells  0.00 10^3/uL (0.00-0.00)   02/10/19  11:55    


 


Absolute Blast Cells  0.00 10^3/uL (0.00-0.00)   02/10/19  11:55    


 


Plasma Cells %  0.0 %  02/10/19  11:55    


 


Platelet Estimate  ADEQUATE  (ADEQ)   02/10/19  11:55    


 


Sodium  134 mEq/L (135-145)  L  02/10/19  11:55    


 


Potassium  4.4 mEq/L (3.5-5.2)   02/10/19  11:55    


 


Chloride  100 mEq/L ()   02/10/19  11:55    


 


Carbon Dioxide  25 mEq/l (22-31)   02/10/19  11:55    


 


Anion Gap  9 mEq/L (6-14)   02/10/19  11:55    


 


BUN  25 mg/dL (7-23)  H  02/10/19  11:55    


 


Creatinine  0.8 mg/dL (0.6-1.0)   02/10/19  11:55    


 


Estimated GFR  > 60   02/10/19  11:55    


 


Glucose  97 mg/dL ()   02/10/19  11:55    


 


Calcium  9.4 mg/dL (8.5-10.4)   02/10/19  11:55    


 


TSH  1.280 uIU/mL (0.465-4.680)   02/10/19  11:55    


 


Urine Color  YELLOW   02/10/19  13:50    


 


Urine Appearance  HAZY   02/10/19  13:50    


 


Urine pH  7.0  (5.0-7.5)   02/10/19  13:50    


 


Ur Specific Gravity  1.011  (1.002-1.030)   02/10/19  13:50    


 


Urine Protein  NEGATIVE  (NEGATIVE)   02/10/19  13:50    


 


Urine Ketones  NEGATIVE  (NEGATIVE)   02/10/19  13:50    


 


Urine Blood  NEGATIVE  (NEGATIVE)   02/10/19  13:50    


 


Urine Nitrate  NEGATIVE  (NEGATIVE)   02/10/19  13:50    


 


Urine Bilirubin  NEGATIVE  (NEGATIVE)   02/10/19  13:50    


 


Urine Urobilinogen  NEGATIVE EU (0.2-1.0)   02/10/19  13:50    


 


Ur Leukocyte Esterase  1+  (NEGATIVE)  H  02/10/19  13:50    


 


Urine RBC  1-3 /hpf (0-3)   02/10/19  13:50    


 


Urine WBC  15-25 /hpf (0-3)  H  02/10/19  13:50    


 


Ur Epithelial Cells  TRACE /lpf (NONE-1+)   02/10/19  13:50    


 


Urine Mucus  TRACE /lpf (NONE-1+)   02/10/19  13:50    


 


Urine Glucose  NEGATIVE  (NEGATIVE)   02/10/19  13:50    











Assessment & Plan


Assessment: 


Weakness (Acute)


- Acute onset per patient and granddaughter


- No focal deficits on exam


- CT head on admission shows no acute etiology


- UA on admission shows possible UTI, will treat


- PT/OT ordered


- If no significant improvement consider MRI Brain, neurology consult 


- Reports productive sputum, CXR with no acute findings on admission, chronic 

findings stable


- Will check Flu and Respiratory PCR to r/o viral etiology





UTI


- UA on admission showing 1+ LE, 15-25 WBC


- Reporting decreased urination and possible dysuria


- Afebrile with no leukocytosis on admission


- Given acute onset of weakness with no clear etiology, will start empiric abx 

with Ceftriaxone


- Urine culture ordered





Hyponatremia


- Na 134 on admission


- IVF as above


- Repeat BMP in the AM





Parkinson's Disease


- Continue Sinemet  home meds including Sinemet, Rivastigmine


- As above, if not improvement in symptoms consult neurology for further 

evaluation and management 





Overactive Bladder


- Continue home Mirabegron 





FEN: IVF, Regular


DVT PPx; SubQ Lovenox


Code: DNR 





Dispo: Admit to Observation

## 2019-02-11 LAB — PLATELET # BLD: 196 10^3/UL (ref 150–400)

## 2019-02-11 RX ADMIN — CARBIDOPA AND LEVODOPA SCH TAB: 25; 100 TABLET ORAL at 12:24

## 2019-02-11 RX ADMIN — ENOXAPARIN SODIUM SCH MG: 100 INJECTION SUBCUTANEOUS at 08:41

## 2019-02-11 RX ADMIN — CARBIDOPA AND LEVODOPA SCH TAB: 25; 100 TABLET ORAL at 08:40

## 2019-02-11 RX ADMIN — THERA TABS SCH EACH: TAB at 08:40

## 2019-02-11 RX ADMIN — CARBIDOPA AND LEVODOPA SCH TAB: 25; 100 TABLET ORAL at 16:42

## 2019-02-11 RX ADMIN — OXYBUTYNIN CHLORIDE SCH MG: 5 TABLET, FILM COATED, EXTENDED RELEASE ORAL at 21:20

## 2019-02-11 NOTE — PDMN
Medical Necessity


Medical necessity: [facility] M300 UTI, A-2 days: 82 yo w/ acute onset weakness and 

UTI.  Initially OBS for workup/initial tx but pt not back to baseline still 

requiring IV antibx, urine cx still pending, and PT/OT tx requiring additional 

MN.  Meets MCG IP criteria for M300 w/ parenteral antbx needed beyond obs care 

tx.  Hx Parkinson's, childhood rheumatic fever, nephritis, small bowel resect.  

Change to IP status 2/11/19@1310 per NP order.

## 2019-02-11 NOTE — ASMTCMCOM
CM Note

 

CM Note                       

Notes:

CM spoke to [place] (P#: [phone]). Pts Jhoana and daughter would like pt to d/c to SNF at [facility]. Argentina is on board w/ this plan. Jhoana reports that pt is confused and cannot go back 

to living independently. Argentina reports that pt gets about 7 hours of private duty care through [facility]. Pt is current w/ [facility] for PT, OT, SPL. Ema (P#: [phone]) is pts CNA that 

assist w/ med management, bringing pt to appointments, and helps w/ indoor/outdoor 

activities. Referral sent to [facility]. Non triggering pasrr completed. CM to follow.



Plan: Possible SNF vs HC with caregivers

 

Date Signed:  02/11/2019 10:57 AM

Electronically Signed By:ARMIN Walker

## 2019-02-11 NOTE — ASMTCMCOM
CM Note

 

CM Note                       

Notes:

Pt is a 82 y/o female admitted for weakness. Pt lives at [facility]. PT is recommending HC. CM 

met w/ pt and grand daughter Jhoana for dispo planning. Jhoana reports that pt has a court 

appointed MDPOA. Her name is [place] and her number is [phone]. CM left a msg. CM to follow.







Plan: TBD

 

Date Signed:  02/11/2019 10:07 AM

Electronically Signed By:ARMIN Walker

## 2019-02-11 NOTE — HOSPPROG
Hospitalist Progress Note


Assessment/Plan: 





Mecca Nye is a 82 yo F with a PMHx of Parkinson's disease, overactive bladder 

who presents to [facility] for weakness.  She usually is able to ambulate with a 

walker.  





*weakness


- Acute onset 


- she has Parkinson's and suffers from low blood pressure


- likely multifactorial, she was dehydrated and had not eaten much


- she also fell, will get a back x ray (having trouble with walking)


- CT head on admission shows no acute etiology


- she has no focal deficits except weakness


- therapies recommending SNF





*pyuria, poss UTI


- UA on admission showing 1+ LE, 15-25 WBC


- Reporting decreased urination and possible dysuria


- Afebrile with no leukocytosis on admission


- Given acute onset of weakness with no clear etiology, will start empiric abx 

with Ceftriaxone


- Urine culture pending





*Hyponatremia


- Na 134 on admission


- resolved


- secondary to hypovolemia





*Parkinson's Disease


- Continue Sinemet , Rivastigmine





*Overactive Bladder


- Mirabegron 





*plan: therapies recommending SNF, will check a back x ray to be sure nothing 

acute happened.  She will require another midnight stay for further evaluation.








Subjective: Mecca has no pain, said she hasn't been eating much and gets a bit 

dizzy at home after showering.


Objective: 


 Vital Signs











Temp Pulse Resp BP Pulse Ox


 


 36.3 C   62   16   120/63   92 


 


 02/11/19 08:00  02/11/19 08:00  02/11/19 08:00  02/11/19 08:00  02/11/19 08:00








 Microbiology











 02/10/19 15:45 Respiratory Panel (PCR) - Final





 Nasal, Sinus - Swab    No Organism Detected By Pcr








 Laboratory Results





 02/11/19 04:24 





 02/11/19 04:24 





 











 02/10/19 02/11/19 02/12/19





 05:59 05:59 05:59


 


Intake Total  1650 


 


Output Total  585 


 


Balance  1065 














- Physical Exam


Constitutional: no apparent distress, appears nourished, other (thin)


Eyes: PERRL


Ears, Nose, Mouth, Throat: hearing normal


Cardiovascular: regular rate and rhythym


Respiratory: no respiratory distress


Gastrointestinal: normoactive bowel sounds


Skin: warm


Musculoskeletal: generalized weakness


Neurologic: AAOx3 (not oriented to year)


Psychiatric: interacting appropriately, not anxious, not encephalopathic, 

thought process linear





ICD10 Worksheet


Patient Problems: 


 Problems











Problem Status Onset


 


Weakness Acute  


 


Bowel obstruction Acute  


 


Dehydration Acute  


 


Dizziness Acute  


 


Nausea Acute  


 


Parkinsons disease Acute

## 2019-02-12 VITALS — SYSTOLIC BLOOD PRESSURE: 112 MMHG | DIASTOLIC BLOOD PRESSURE: 66 MMHG

## 2019-02-12 RX ADMIN — CARBIDOPA AND LEVODOPA SCH TAB: 25; 100 TABLET ORAL at 12:12

## 2019-02-12 RX ADMIN — THERA TABS SCH EACH: TAB at 08:26

## 2019-02-12 RX ADMIN — CARBIDOPA AND LEVODOPA SCH TAB: 25; 100 TABLET ORAL at 08:26

## 2019-02-12 RX ADMIN — CARBIDOPA AND LEVODOPA SCH TAB: 25; 100 TABLET ORAL at 15:14

## 2019-02-12 RX ADMIN — ENOXAPARIN SODIUM SCH MG: 100 INJECTION SUBCUTANEOUS at 08:27

## 2019-02-12 NOTE — ASMTDCNOTE
Case Management Discharge

 

Discharge Order Complete?     Answers:  Yes                                   

Patient to Obtain             Answers:  via Family                            

Medications                                                                   

Transportation Arranged       Answers:  Family/Friends                        

[facility] Complete               Answers:  No                                    

Case Management Transport     Answers:  No                                    

Form Complete                                                                 

Faxed Final Orders            Answers:  Yes                                   

Agency/Facility Transfer      Answers:  Yes                                   

Report Printed & Faxed to                                                     

Receiving Agency                                                              

Family Notified               Answers:  No                                    

Discharge Comments            

Notes:

Pts case discussed w/ Aubrie Neves NP. CM spoke to [place] and she does not want pt to go to SNF 

if she does not need to. PT worked w/ pt and reported that she is back at baseline and can return 

w/ HC and private duty caregivers to [facility]. Argentina will provided transportation back to 

[facility]. [place] will be accompanied by her  to pick pt up. DC orders sent to [facility] 

. CM available for changes.







Plan: [facility]; PT, OT, SPL

 

Date Signed:  02/12/2019 02:52 PM

Electronically Signed By:ARMIN Walker

## 2019-02-12 NOTE — HOSPPROG
Hospitalist Progress Note


Assessment/Plan: 





Mecca Nye is a 84 yo F with a PMHx of Parkinson's disease, overactive bladder 

who presents to [facility] for weakness.  She usually is able to ambulate with a 

walker.  





*weakness


- Acute onset 


- she has Parkinson's and suffers from low blood pressure


- likely multifactorial, she was dehydrated and had not eaten much


- reviewed back x ray which showed nothing acute


- CT head on admission shows no acute etiology


- she has no focal deficits except weakness


-spoke w PT and recommendation for her was to return to her home w home care





*pyuria, poss UTI


- UA on admission showing 1+ LE, 15-25 WBC


- Reporting decreased urination and possible dysuria


- Afebrile with no leukocytosis on admission


- dc ceftriaxone after today's dose


- Urine culture shows gram neg rods and skin brittany





*Hyponatremia


- Na 134 on admission


- resolved


- secondary to hypovolemia





*Parkinson's Disease


- Continue Sinemet , Rivastigmine





*Overactive Bladder


- Mirabegron 





*plan:  suspect her weakness is multifactorial; will have her return home w 

home care





Subjective: Mecca is feeling better but concerned she hasn't had a bowel 

movement.


Objective: 


 Vital Signs











Temp Pulse Resp BP Pulse Ox


 


 36.6 C   67   16   124/73 H  91 L


 


 02/12/19 08:00  02/12/19 08:00  02/12/19 08:00  02/12/19 08:00  02/12/19 04:00








 











 02/11/19 02/12/19 02/13/19





 05:59 05:59 05:59


 


Intake Total  400 


 


Output Total  350 


 


Balance  50 














- Physical Exam


Constitutional: no apparent distress, appears nourished


Eyes: PERRL


Ears, Nose, Mouth, Throat: hearing normal


Cardiovascular: regular rate and rhythym


Respiratory: no respiratory distress


Skin: warm


Musculoskeletal: generalized weakness


Neurologic: AAOx3


Psychiatric: interacting appropriately





ICD10 Worksheet


Patient Problems: 


 Problems











Problem Status Onset


 


Weakness Acute  


 


Bowel obstruction Acute  


 


Dehydration Acute  


 


Dizziness Acute  


 


Nausea Acute  


 


Parkinsons disease Acute

## 2019-02-12 NOTE — ASMTLACE
LACE

 

Length of stay for            Answers:  2 days                                

current admission                                                             

Acuity / Level of             Answers:  Yes                                   

Care: Did the patient                                                         

have an inpatient                                                             

admission?                                                                    

Comorbidities - select        Answers:  Mild liver or renal                   

all that apply                          disease                               

                                        Other                         Notes:  Parkinson's disease

# of Emergency department     Answers:  1-2                                   

visits in the last 6                                                          

months                                                                        

Score: 9

 

Date Signed:  02/12/2019 01:49 PM

Electronically Signed By:ARMIN Walker

## 2019-02-12 NOTE — PDIAF
- Diagnosis


Diagnosis: weakness, dehydration, parkinsons


Code Status: Do Not Resuscitate





- Medication Management


Discharge Medications: electronically signed and located in the Home Medication 

List.





- Orders


Services needed: Home Care, Registered Nurse, Physical Therapy, Occupational 

Therapy, Speech Language Pathologist


Home Care Face to Face: I certify that this patient was under my care and that 

I had the required face-to-face encounter meeting the encounter requirements on 

the discharge day.  My findings support the fact that the patient is homebound 

as defined in


Home Care Face to Face Continued: CMS Chapter 7 Medicare Benefits Manual 30.1.1

, The condition of the patient is such that there exists a normal inability to 

leave home and consequently, leaving home would require a considerable and 

taxing effort.


Diet Recommendation: no restrictions on diet


Diet Texture: Regular Texture Diet





- Follow Up Care


Current Providers and Referrals: 


Patient,NotPresent [Unknown] - As per Instructions

## 2019-02-12 NOTE — ASDISCHSUM
----------------------------------------------

Discharge Information

----------------------------------------------

Plan Status:Home with Home Health                    Medically Cleared to Leave:02/11/2019

Discharge Date:02/11/2019                            CM D/C Disposition:

ADT D/C Disposition:                                 Projected Discharge Date:02/12/2019 11:00 AM

Transportation at D/C:                               Discharge Delay Reason:

Follow-Up Date:02/12/2019 11:00 AM                   Discharge Slot:

Final Diagnosis:

----------------------------------------------

Placement Information

----------------------------------------------

Referral Type:*Nursing Home/SNF                      Referral ID:[id number]

Provider Name:

Address 1:                                           Phone Number:

Address 2:                                           Fax Number:

City:                                                Selection Factors:

State:

 

Referral Type:*Home Health Care Services             Referral ID:[id number]

Provider Name:[facility]

Address 1:[address]                 Phone [phone]

Address 2:                                           Fax [phone]

City:[place]                               Selection Factors:

State:[place]

 

----------------------------------------------

Patient Contact Information

----------------------------------------------

Contact Name:ARTEMIO                              Relationship:Other

Address:                                             Home [phone]

                                                     Work Phone:

City:                                                Alternate Phone:

State/[facility] Code:                                      Email:

----------------------------------------------

Financial Information

----------------------------------------------

Financial Class:Medicare

Primary Plan Desc:MEDICARE INPATIENT                 Primary Plan Number:[id number]

Secondary Plan Desc:BRIAN                             Secondary Plan Number:[id number]

 

 

----------------------------------------------

Assessment Information

----------------------------------------------

----------------------------------------------

LACE

----------------------------------------------

LACE

 

Length of stay for            Answers:  2 days                                

current admission                                                             

Acuity / Level of             Answers:  Yes                                   

Care: Did the patient                                                         

have an inpatient                                                             

admission?                                                                    

Comorbidities - select        Answers:  Mild liver or renal                   

all that apply                          disease                               

                                        Other                         Notes:  Parkinson's disease

# of Emergency department     Answers:  1-2                                   

visits in the last 6                                                          

months                                                                        

Score: 9

 

Date Signed:  02/12/2019 01:49 PM

Electronically Signed By:ARMIN Walker

 

 

----------------------------------------------

[facility] CM Progress Note

----------------------------------------------

CM Note

 

CM Note                       

Notes:

Pt is a 84 y/o female admitted for weakness. Pt lives at [facility]. PT is recommending HC. CM 

met w/ pt and grand daughter Jhoana for dispo planning. Jhoana reports that pt has a court 

appointed MDPOA. Her name is Argentina and her number is [phone]. CM left a msg. CM to follow.







Plan: TBD

 

Date Signed:  02/11/2019 10:07 AM

Electronically Signed By:ARMIN Walker

 

 

----------------------------------------------

[facility] Progress Note

----------------------------------------------

CM Note

 

CM Note                       

Notes:

CM spoke to Argentina (P#: [phone]). Pts Jhoana and daughter would like pt to d/c to SNF at [facility]. Argentina is on board w/ this plan. Jhoana reports that pt is confused and cannot go back 

to living independently. Argentina reports that pt gets about 7 hours of private duty care through [facility]. Pt is current w/ [facility] for PT, OT, SPL. Ema (P#: [phone]) is pts CNA that 

assist w/ med management, bringing pt to appointments, and helps w/ indoor/outdoor 

activities. Referral sent to [facility]. Non triggering pasrr completed. CM to follow.



Plan: Possible SNF vs HC with caregivers

 

Date Signed:  02/11/2019 10:57 AM

Electronically Signed By:ARMIN Walker

 

 

----------------------------------------------

Case Management Discharge Plan Note

----------------------------------------------

Case Management Discharge

 

Discharge Order Complete?     Answers:  Yes                                   

Patient to Obtain             Answers:  via Family                            

Medications                                                                   

Transportation Arranged       Answers:  Family/Friends                        

[facility] Complete               Answers:  No                                    

Case Management Transport     Answers:  No                                    

Form Complete                                                                 

Faxed Final Orders            Answers:  Yes                                   

Agency/Facility Transfer      Answers:  Yes                                   

Report Printed & Faxed to                                                     

Receiving Agency                                                              

Family Notified               Answers:  No                                    

Discharge Comments            

Notes:

Pts case discussed w/ Aubrie Neves NP. CM spoke to Argentina and she does not want pt to go to SNF 

if she does not need to. PT worked w/ pt and reported that she is back at baseline and can return 

w/ HC and private duty caregivers to [facility]. Argentina will provided transportation back to 

[facility]. Argentina will be accompanied by her  to pick pt up. DC orders sent to [facility]. CM available for changes.







Plan: [facility]; PT, OT, SPL

 

Date Signed:  02/12/2019 02:52 PM

Electronically Signed By:ARMIN Walker

 

 

----------------------------------------------

Intervention Information

----------------------------------------------

Intervention Type:*JULIA-Signed                       Date of Service:02/11/2019 10:41 AM

Patient Type:Observation                             Staff Member:Yanni Beatty

Hours:                                               Discipline:

Severity:                                            Comment:

## 2019-02-12 NOTE — GDS
[f rep st]



                                                             DISCHARGE SUMMARY





DISCHARGE DIAGNOSES:  

1.  Weakness with acute onset.

2.  Pyuria, concern for a urinary tract infection.

3.  Hyponatremia.

4.  Parkinson's disease.

5.  Overactive bladder.



HISTORY OF PRESENT ILLNESS:  Briefly, Mecca Nye is an 83-year-old female with a past medical histor
y of Parkinson's disease, overactive bladder who presented to [facility] for weakness. 
 She is usually able to ambulate with a walker.  She was admitted and hydrated.  She was also treated
 for a possible urinary tract infection.  I reviewed her care with Physical Therapy.  She is back to 
her baseline.  She will return home with home care.



HOSPITAL COURSE:  

1.  Weakness.  I suspect this is multifactorial, including being dehydrated, not eating well and has 
low blood pressure.  She has no focal deficits.  A CT on admission showed nothing acute.

2.  Pyuria, possibly UTI.  She has been afebrile.  She did receive full treatment ceftriaxone.  Her u
rine culture grew out gram-negative rods as well as normal skin brittany.

3.  Hyponatremia, resolved.  This is secondary to hypovolemia.

4.  Parkinson's.  Continue Sinemet and rivastigmine.

5.  Overactive bladder, on mirabegron.



DISCHARGE CONDITION:  Stable.  Blood pressure is 124/73, heart rate is 67, respiratory rate of 16, O2
 saturation on room air 91%, temperature 36.6 Celsius.



MEDICATIONS AT DISCHARGE:  Please see the EMR.



DISCHARGE INSTRUCTIONS:  

1.  To stay well hydrated.

2.  Will order home care, PT and OT and Speech Therapy to work with her.





Job #:  [id number]

## 2019-03-26 ENCOUNTER — HOSPITAL ENCOUNTER (OUTPATIENT)
Dept: HOSPITAL 80 - BHFA | Age: 84
End: 2019-03-26
Attending: INTERNAL MEDICINE
Payer: COMMERCIAL

## 2019-03-26 DIAGNOSIS — I95.9: Primary | ICD-10-CM

## 2019-03-26 DIAGNOSIS — R53.1: ICD-10-CM
